# Patient Record
Sex: FEMALE | Race: BLACK OR AFRICAN AMERICAN | Employment: STUDENT | URBAN - METROPOLITAN AREA
[De-identification: names, ages, dates, MRNs, and addresses within clinical notes are randomized per-mention and may not be internally consistent; named-entity substitution may affect disease eponyms.]

---

## 2019-01-08 ENCOUNTER — HOSPITAL ENCOUNTER (INPATIENT)
Age: 24
LOS: 2 days | Discharge: LEFT BEFORE DISCHARGE | DRG: 566 | End: 2019-01-10
Attending: EMERGENCY MEDICINE | Admitting: OBSTETRICS & GYNECOLOGY
Payer: MEDICAID

## 2019-01-08 DIAGNOSIS — Z3A.27 27 WEEKS GESTATION OF PREGNANCY: Primary | ICD-10-CM

## 2019-01-08 DIAGNOSIS — R10.31 ABDOMINAL PAIN, RIGHT LOWER QUADRANT: ICD-10-CM

## 2019-01-08 DIAGNOSIS — R50.81 FEVER IN OTHER DISEASES: ICD-10-CM

## 2019-01-08 PROBLEM — O23.02 PYELONEPHRITIS AFFECTING PREGNANCY IN SECOND TRIMESTER: Status: ACTIVE | Noted: 2019-01-08

## 2019-01-08 LAB
ALBUMIN SERPL-MCNC: 2.9 G/DL (ref 3.4–5)
ALBUMIN/GLOB SERPL: 0.7 {RATIO} (ref 0.8–1.7)
ALP SERPL-CCNC: 114 U/L (ref 45–117)
ALT SERPL-CCNC: 16 U/L (ref 13–56)
ANION GAP SERPL CALC-SCNC: 13 MMOL/L (ref 3–18)
APPEARANCE UR: ABNORMAL
APPEARANCE UR: ABNORMAL
AST SERPL-CCNC: 18 U/L (ref 15–37)
BACTERIA URNS QL MICRO: ABNORMAL /HPF
BASOPHILS # BLD: 0 K/UL (ref 0–0.1)
BASOPHILS NFR BLD: 0 % (ref 0–2)
BILIRUB SERPL-MCNC: 0.4 MG/DL (ref 0.2–1)
BILIRUB UR QL: NEGATIVE
BILIRUB UR QL: NEGATIVE
BUN SERPL-MCNC: 5 MG/DL (ref 7–18)
BUN/CREAT SERPL: 11 (ref 12–20)
CALCIUM SERPL-MCNC: 8.5 MG/DL (ref 8.5–10.1)
CHLORIDE SERPL-SCNC: 101 MMOL/L (ref 100–108)
CO2 SERPL-SCNC: 21 MMOL/L (ref 21–32)
COLOR UR: ABNORMAL
COLOR UR: YELLOW
CREAT SERPL-MCNC: 0.47 MG/DL (ref 0.6–1.3)
DIFFERENTIAL METHOD BLD: ABNORMAL
EOSINOPHIL # BLD: 0 K/UL (ref 0–0.4)
EOSINOPHIL NFR BLD: 0 % (ref 0–5)
EPITH CASTS URNS QL MICRO: NEGATIVE /LPF (ref 0–5)
ERYTHROCYTE [DISTWIDTH] IN BLOOD BY AUTOMATED COUNT: 13.6 % (ref 11.6–14.5)
FLUAV AG NPH QL IA: NEGATIVE
FLUBV AG NOSE QL IA: NEGATIVE
GLOBULIN SER CALC-MCNC: 4.1 G/DL (ref 2–4)
GLUCOSE SERPL-MCNC: 71 MG/DL (ref 74–99)
GLUCOSE UR QL STRIP.AUTO: NEGATIVE MG/DL
GLUCOSE UR STRIP.AUTO-MCNC: NEGATIVE MG/DL
HBSAG, EXTERNAL: NEGATIVE
HCT VFR BLD AUTO: 27.9 % (ref 35–45)
HGB BLD-MCNC: 9.5 G/DL (ref 12–16)
HGB UR QL STRIP: ABNORMAL
HIV, EXTERNAL: NORMAL
KETONES UR QL STRIP.AUTO: 80 MG/DL
KETONES UR-MCNC: 80 MG/DL
LACTATE BLD-SCNC: 0.81 MMOL/L (ref 0.4–2)
LEUKOCYTE ESTERASE UR QL STRIP.AUTO: ABNORMAL
LEUKOCYTE ESTERASE UR QL STRIP: ABNORMAL
LIPASE SERPL-CCNC: 141 U/L (ref 73–393)
LYMPHOCYTES # BLD: 0.8 K/UL (ref 0.9–3.6)
LYMPHOCYTES NFR BLD: 9 % (ref 21–52)
MCH RBC QN AUTO: 27.5 PG (ref 24–34)
MCHC RBC AUTO-ENTMCNC: 34.1 G/DL (ref 31–37)
MCV RBC AUTO: 80.6 FL (ref 74–97)
MONOCYTES # BLD: 0.7 K/UL (ref 0.05–1.2)
MONOCYTES NFR BLD: 8 % (ref 3–10)
NEUTS SEG # BLD: 7.8 K/UL (ref 1.8–8)
NEUTS SEG NFR BLD: 83 % (ref 40–73)
NITRITE UR QL STRIP.AUTO: POSITIVE
NITRITE UR QL: POSITIVE
PH UR STRIP: 5.5 [PH] (ref 5–8)
PH UR: 6 [PH] (ref 5–9)
PLATELET # BLD AUTO: 207 K/UL (ref 135–420)
PMV BLD AUTO: 10.9 FL (ref 9.2–11.8)
POTASSIUM SERPL-SCNC: 3.6 MMOL/L (ref 3.5–5.5)
PROT SERPL-MCNC: 7 G/DL (ref 6.4–8.2)
PROT UR QL: 30 MG/DL
PROT UR STRIP-MCNC: ABNORMAL MG/DL
RBC # BLD AUTO: 3.46 M/UL (ref 4.2–5.3)
RBC # UR STRIP: ABNORMAL /UL
RBC #/AREA URNS HPF: ABNORMAL /HPF (ref 0–5)
RPR, EXTERNAL: NORMAL
RUBELLA, EXTERNAL: NORMAL
SERVICE CMNT-IMP: ABNORMAL
SODIUM SERPL-SCNC: 135 MMOL/L (ref 136–145)
SP GR UR REFRACTOMETRY: 1.01 (ref 1–1.03)
SP GR UR: 1.01 (ref 1–1.02)
TYPE, ABO & RH, EXTERNAL: NORMAL
UROBILINOGEN UR QL STRIP.AUTO: 0.2 EU/DL (ref 0.2–1)
UROBILINOGEN UR QL: 0.2 EU/DL (ref 0.2–1)
WBC # BLD AUTO: 9.4 K/UL (ref 4.6–13.2)
WBC URNS QL MICRO: ABNORMAL /HPF (ref 0–4)

## 2019-01-08 PROCEDURE — 96360 HYDRATION IV INFUSION INIT: CPT

## 2019-01-08 PROCEDURE — 65270000029 HC RM PRIVATE

## 2019-01-08 PROCEDURE — 87077 CULTURE AEROBIC IDENTIFY: CPT

## 2019-01-08 PROCEDURE — 80053 COMPREHEN METABOLIC PANEL: CPT

## 2019-01-08 PROCEDURE — 74011250637 HC RX REV CODE- 250/637: Performed by: PHYSICIAN ASSISTANT

## 2019-01-08 PROCEDURE — 99285 EMERGENCY DEPT VISIT HI MDM: CPT

## 2019-01-08 PROCEDURE — 85025 COMPLETE CBC W/AUTO DIFF WBC: CPT

## 2019-01-08 PROCEDURE — 81001 URINALYSIS AUTO W/SCOPE: CPT

## 2019-01-08 PROCEDURE — 74011250636 HC RX REV CODE- 250/636: Performed by: PHYSICIAN ASSISTANT

## 2019-01-08 PROCEDURE — 83605 ASSAY OF LACTIC ACID: CPT

## 2019-01-08 PROCEDURE — 99218 HC RM OBSERVATION: CPT

## 2019-01-08 PROCEDURE — 87086 URINE CULTURE/COLONY COUNT: CPT

## 2019-01-08 PROCEDURE — 81003 URINALYSIS AUTO W/O SCOPE: CPT

## 2019-01-08 PROCEDURE — 87186 SC STD MICRODIL/AGAR DIL: CPT

## 2019-01-08 PROCEDURE — 87804 INFLUENZA ASSAY W/OPTIC: CPT

## 2019-01-08 PROCEDURE — 74011000250 HC RX REV CODE- 250: Performed by: OBSTETRICS & GYNECOLOGY

## 2019-01-08 PROCEDURE — 74011250636 HC RX REV CODE- 250/636: Performed by: OBSTETRICS & GYNECOLOGY

## 2019-01-08 PROCEDURE — 83690 ASSAY OF LIPASE: CPT

## 2019-01-08 RX ORDER — ACETAMINOPHEN 500 MG
1000 TABLET ORAL
Status: DISCONTINUED | OUTPATIENT
Start: 2019-01-08 | End: 2019-01-11 | Stop reason: HOSPADM

## 2019-01-08 RX ORDER — SODIUM CHLORIDE 9 MG/ML
125 INJECTION, SOLUTION INTRAVENOUS CONTINUOUS
Status: DISCONTINUED | OUTPATIENT
Start: 2019-01-09 | End: 2019-01-11 | Stop reason: HOSPADM

## 2019-01-08 RX ORDER — DIPHENHYDRAMINE HCL 25 MG
25 CAPSULE ORAL
Status: DISCONTINUED | OUTPATIENT
Start: 2019-01-08 | End: 2019-01-11 | Stop reason: HOSPADM

## 2019-01-08 RX ORDER — SODIUM CHLORIDE, SODIUM LACTATE, POTASSIUM CHLORIDE, CALCIUM CHLORIDE 600; 310; 30; 20 MG/100ML; MG/100ML; MG/100ML; MG/100ML
125 INJECTION, SOLUTION INTRAVENOUS CONTINUOUS
Status: DISCONTINUED | OUTPATIENT
Start: 2019-01-09 | End: 2019-01-09

## 2019-01-08 RX ORDER — SODIUM CHLORIDE 0.9 % (FLUSH) 0.9 %
5-40 SYRINGE (ML) INJECTION AS NEEDED
Status: DISCONTINUED | OUTPATIENT
Start: 2019-01-08 | End: 2019-01-11 | Stop reason: HOSPADM

## 2019-01-08 RX ORDER — MAG HYDROX/ALUMINUM HYD/SIMETH 200-200-20
30 SUSPENSION, ORAL (FINAL DOSE FORM) ORAL
Status: DISCONTINUED | OUTPATIENT
Start: 2019-01-08 | End: 2019-01-11 | Stop reason: HOSPADM

## 2019-01-08 RX ORDER — ACETAMINOPHEN 325 MG/1
650 TABLET ORAL
Status: COMPLETED | OUTPATIENT
Start: 2019-01-08 | End: 2019-01-08

## 2019-01-08 RX ORDER — SODIUM CHLORIDE 0.9 % (FLUSH) 0.9 %
5-40 SYRINGE (ML) INJECTION EVERY 8 HOURS
Status: DISCONTINUED | OUTPATIENT
Start: 2019-01-08 | End: 2019-01-09

## 2019-01-08 RX ORDER — SODIUM CHLORIDE 9 MG/ML
150 INJECTION, SOLUTION INTRAVENOUS ONCE
Status: DISPENSED | OUTPATIENT
Start: 2019-01-08 | End: 2019-01-09

## 2019-01-08 RX ORDER — ONDANSETRON 4 MG/1
4 TABLET, ORALLY DISINTEGRATING ORAL
Status: DISCONTINUED | OUTPATIENT
Start: 2019-01-08 | End: 2019-01-11 | Stop reason: HOSPADM

## 2019-01-08 RX ADMIN — SODIUM CHLORIDE 125 ML/HR: 900 INJECTION, SOLUTION INTRAVENOUS at 23:35

## 2019-01-08 RX ADMIN — SODIUM CHLORIDE 1000 ML: 900 INJECTION, SOLUTION INTRAVENOUS at 21:04

## 2019-01-08 RX ADMIN — SODIUM CHLORIDE, SODIUM LACTATE, POTASSIUM CHLORIDE, AND CALCIUM CHLORIDE 125 ML/HR: 600; 310; 30; 20 INJECTION, SOLUTION INTRAVENOUS at 23:49

## 2019-01-08 RX ADMIN — WATER 1 G: 1 INJECTION INTRAMUSCULAR; INTRAVENOUS; SUBCUTANEOUS at 23:35

## 2019-01-08 RX ADMIN — ACETAMINOPHEN 650 MG: 325 TABLET ORAL at 21:03

## 2019-01-09 PROBLEM — Z64.1 GRAND MULTIPARITY: Status: ACTIVE | Noted: 2019-01-09

## 2019-01-09 LAB
ALBUMIN SERPL-MCNC: 2.4 G/DL (ref 3.4–5)
ALBUMIN/GLOB SERPL: 0.6 {RATIO} (ref 0.8–1.7)
ALP SERPL-CCNC: 105 U/L (ref 45–117)
ALT SERPL-CCNC: 12 U/L (ref 13–56)
ANION GAP SERPL CALC-SCNC: 8 MMOL/L (ref 3–18)
AST SERPL-CCNC: 12 U/L (ref 15–37)
BASOPHILS # BLD: 0 K/UL (ref 0–0.1)
BASOPHILS NFR BLD: 0 % (ref 0–2)
BILIRUB SERPL-MCNC: 0.3 MG/DL (ref 0.2–1)
BUN SERPL-MCNC: 6 MG/DL (ref 7–18)
BUN/CREAT SERPL: 14 (ref 12–20)
CALCIUM SERPL-MCNC: 7.3 MG/DL (ref 8.5–10.1)
CHLORIDE SERPL-SCNC: 109 MMOL/L (ref 100–108)
CO2 SERPL-SCNC: 21 MMOL/L (ref 21–32)
CREAT SERPL-MCNC: 0.43 MG/DL (ref 0.6–1.3)
DIFFERENTIAL METHOD BLD: ABNORMAL
EOSINOPHIL # BLD: 0 K/UL (ref 0–0.4)
EOSINOPHIL NFR BLD: 0 % (ref 0–5)
ERYTHROCYTE [DISTWIDTH] IN BLOOD BY AUTOMATED COUNT: 13.9 % (ref 11.6–14.5)
GLOBULIN SER CALC-MCNC: 4.1 G/DL (ref 2–4)
GLUCOSE SERPL-MCNC: 92 MG/DL (ref 74–99)
HCT VFR BLD AUTO: 23.5 % (ref 35–45)
HGB BLD-MCNC: 8 G/DL (ref 12–16)
LYMPHOCYTES # BLD: 1 K/UL (ref 0.9–3.6)
LYMPHOCYTES NFR BLD: 15 % (ref 21–52)
MCH RBC QN AUTO: 26.8 PG (ref 24–34)
MCHC RBC AUTO-ENTMCNC: 34 G/DL (ref 31–37)
MCV RBC AUTO: 78.6 FL (ref 74–97)
MONOCYTES # BLD: 0.5 K/UL (ref 0.05–1.2)
MONOCYTES NFR BLD: 8 % (ref 3–10)
NEUTS SEG # BLD: 5.4 K/UL (ref 1.8–8)
NEUTS SEG NFR BLD: 77 % (ref 40–73)
PLATELET # BLD AUTO: 187 K/UL (ref 135–420)
PMV BLD AUTO: 10.8 FL (ref 9.2–11.8)
POTASSIUM SERPL-SCNC: 3.4 MMOL/L (ref 3.5–5.5)
PROT SERPL-MCNC: 6.5 G/DL (ref 6.4–8.2)
RBC # BLD AUTO: 2.99 M/UL (ref 4.2–5.3)
SODIUM SERPL-SCNC: 138 MMOL/L (ref 136–145)
WBC # BLD AUTO: 7 K/UL (ref 4.6–13.2)

## 2019-01-09 PROCEDURE — 74011250637 HC RX REV CODE- 250/637: Performed by: OBSTETRICS & GYNECOLOGY

## 2019-01-09 PROCEDURE — 74011000250 HC RX REV CODE- 250: Performed by: OBSTETRICS & GYNECOLOGY

## 2019-01-09 PROCEDURE — 74011250636 HC RX REV CODE- 250/636: Performed by: OBSTETRICS & GYNECOLOGY

## 2019-01-09 PROCEDURE — 65270000029 HC RM PRIVATE

## 2019-01-09 PROCEDURE — 80053 COMPREHEN METABOLIC PANEL: CPT

## 2019-01-09 PROCEDURE — 85025 COMPLETE CBC W/AUTO DIFF WBC: CPT

## 2019-01-09 RX ORDER — ZOLPIDEM TARTRATE 5 MG/1
5 TABLET ORAL
Status: DISCONTINUED | OUTPATIENT
Start: 2019-01-09 | End: 2019-01-11 | Stop reason: HOSPADM

## 2019-01-09 RX ORDER — MAG HYDROX/ALUMINUM HYD/SIMETH 200-200-20
30 SUSPENSION, ORAL (FINAL DOSE FORM) ORAL
Status: DISCONTINUED | OUTPATIENT
Start: 2019-01-09 | End: 2019-01-11 | Stop reason: HOSPADM

## 2019-01-09 RX ADMIN — ACETAMINOPHEN 1000 MG: 500 TABLET, FILM COATED ORAL at 20:06

## 2019-01-09 RX ADMIN — SODIUM CHLORIDE 125 ML/HR: 900 INJECTION, SOLUTION INTRAVENOUS at 19:55

## 2019-01-09 RX ADMIN — WATER 1 G: 1 INJECTION INTRAMUSCULAR; INTRAVENOUS; SUBCUTANEOUS at 23:16

## 2019-01-09 RX ADMIN — ACETAMINOPHEN 1000 MG: 500 TABLET, FILM COATED ORAL at 03:50

## 2019-01-09 RX ADMIN — SODIUM CHLORIDE, SODIUM LACTATE, POTASSIUM CHLORIDE, AND CALCIUM CHLORIDE 125 ML/HR: 600; 310; 30; 20 INJECTION, SOLUTION INTRAVENOUS at 03:50

## 2019-01-09 RX ADMIN — ACETAMINOPHEN 1000 MG: 500 TABLET, FILM COATED ORAL at 12:06

## 2019-01-09 RX ADMIN — SODIUM CHLORIDE 125 ML/HR: 900 INJECTION, SOLUTION INTRAVENOUS at 11:10

## 2019-01-09 RX ADMIN — PRENATAL VITAMINS-IRON FUMARATE 27 MG IRON-FOLIC ACID 0.8 MG TABLET 1 TABLET: at 10:00

## 2019-01-09 RX ADMIN — DIPHENHYDRAMINE HYDROCHLORIDE 25 MG: 25 CAPSULE ORAL at 00:35

## 2019-01-09 NOTE — PROGRESS NOTES
2215: Pt arrived via medics accompaniedby self, c/o R sided pain, worse when moving or touching, +CVA tenderness, cramping earlier in the day but was not constant. Denies VB, LOF. 
 
2220: SBAR with MD hilario, Orders received, admit to antepartum for treatment of poss. pylonephritis.

## 2019-01-09 NOTE — ED NOTES
TRANSFER - OUT REPORT: 
 
Verbal report given to Tavon Justin (name) on Luisito Smoke  being transferred to Labor and Delivery(unit) for routine progression of care Report consisted of patients Situation, Background, Assessment and  
Recommendations(SBAR). Information from the following report(s) SBAR was reviewed with the receiving nurse. Lines:  
Peripheral IV 01/08/19 Left Antecubital (Active) Site Assessment Clean, dry, & intact 1/8/2019  8:58 PM  
Phlebitis Assessment 0 1/8/2019  8:58 PM  
Infiltration Assessment 0 1/8/2019  8:58 PM  
Dressing Status Clean, dry, & intact 1/8/2019  8:58 PM  
Hub Color/Line Status Pink 1/8/2019  8:58 PM  
  
 
Opportunity for questions and clarification was provided. Patient transported with: 
 Monitor

## 2019-01-09 NOTE — H&P
History & Physical 
 
Name: Howard Becker MRN: [id number]  SSN: [id number] YOB: 1995  Age: 24 y.o. Sex: female Subjective:  
 
Reason for Admission:  Pregnancy and Pyelonephritis History of Present Illness: Ms. Jeannette Chaudhary is a 24 y.o.  female with an estimated gestational age of 27w3d with Estimated Date of Delivery: 19. Patient complains of mild right sided abdominal pain, fever, moderate headache , moderate nausea/vomiting and severe right sided flank or back pain x for 1 day. Pregnancy has been complicated by recurrent UTI. Patient denies contractions, pelvic pressure, vaginal bleeding  and vaginal leaking of fluid . She reports that she had pyelo with her last pregnancy and tends to get recurrent UTIs during pregnancy. She feels much better since receiving IV Rocephin, IVF, and IV antiemetics. She was almost unable to walk due to the severity of her back pain and was not able to tolerate any food, but pain and N/V are significantly improved. She received prenatal care in [place]. OB History  Para Term  AB Living 7 6 6     5 SAB TAB Ectopic Molar Multiple Live Births 0 6 # Outcome Date GA Lbr El/2nd Weight Sex Delivery Anes PTL Lv  
7 Current 6 Term 2017        EDWIN  
5 Term 16 40w2d 07:48 / 00:03  M VAGINAL DELI EPIDURAL AN N EDWIN  
4 Term 14 40w2d 27:30 / 00:31 7 lb 0.6 oz (3.193 kg) M VAGINAL DELI Local N EDWIN  
3 Term 13    M VAGINAL DELI EPI  DEC Birth Comments: SIDS  
2 Term 12    M VAGINAL DELI EPI  EDWIN  
1 Term 11    M VAGINAL DELI EPI  EDWIN Past Medical History:  
Diagnosis Date  Anemia   
 taking iron supplelments  Anemia NEC  Asthma [address] [place] Pals[place] in last pregnancy  Postpartum depression   
 severe  Psychiatric problem   
 depression  Pyelonephritis affecting pregnancy in second trimester 2019  Sickle-cell disease, unspecified   
 trait History reviewed. No pertinent surgical history. Social History Occupational History  Not on file Tobacco Use  Smoking status: Current Every Day Smoker Packs/day: 0.25  Smokeless tobacco: Never Used Substance and Sexual Activity  Alcohol use: No  
 Drug use: No  
 Sexual activity: Yes  
  Partners: Male Family History Problem Relation Age of Onset  Diabetes Maternal Grandmother  Hypertension Maternal Grandmother  Diabetes Paternal Grandmother  Heart Disease Paternal Grandmother  Hypertension Paternal Grandmother Allergies Allergen Reactions  Banana Itching  Coconut Itching  Iodine Swelling Pt states she has had betadine in the past and can have that  Kiwi Itching  Shellfish Derived Swelling Prior to Admission medications Not on File Review of Systems: A comprehensive review of systems was negative except for that written in the History of Present Illness. Objective:  
 
Vitals:   
Vitals:  
 19 8777 19 0655 19 0700 19 6287 BP:   94/52 Pulse:   86 Resp:      
Temp:      
SpO2: 99% 100% 99% 99% Weight:      
Height:      
  
Temp (24hrs), Av.3 °F (37.4 °C), Min:98.8 °F (37.1 °C), Max:100.1 °F (37.8 °C) BP  Min: 85/50  Max: 101/59 Physical Exam: 
Heart: Regular rate and rhythm Lung: clear to auscultation throughout lung fields Back: costovertebral angle tenderness absent Abdomen: soft, nontender Fundus: soft and non tender Perineum: blood absent, amniotic fluid absent Lower Extremities:  - Edema No    
Membranes:  Intact Uterine Activity:  None Fetal Heart Rate:  Reactive Baseline: 150s per minute Variability: moderate Accelerations: yes Decelerations: none Uterine contractions: none Lab/Data Review: 
Recent Results (from the past 24 hour(s)) POC LACTIC ACID  Collection Time: 19  8:55 PM  
 Result Value Ref Range Lactic Acid (POC) 0.81 0.40 - 2.00 mmol/L  
CBC WITH AUTOMATED DIFF Collection Time: 01/08/19  8:56 PM  
Result Value Ref Range WBC 9.4 4.6 - 13.2 K/uL  
 RBC 3.46 (L) 4.20 - 5.30 M/uL HGB 9.5 (L) 12.0 - 16.0 g/dL HCT 27.9 (L) 35.0 - 45.0 % MCV 80.6 74.0 - 97.0 FL  
 MCH 27.5 24.0 - 34.0 PG  
 MCHC 34.1 31.0 - 37.0 g/dL  
 RDW 13.6 11.6 - 14.5 % PLATELET 907 137 - 037 K/uL MPV 10.9 9.2 - 11.8 FL  
 NEUTROPHILS 83 (H) 40 - 73 % LYMPHOCYTES 9 (L) 21 - 52 % MONOCYTES 8 3 - 10 % EOSINOPHILS 0 0 - 5 % BASOPHILS 0 0 - 2 %  
 ABS. NEUTROPHILS 7.8 1.8 - 8.0 K/UL  
 ABS. LYMPHOCYTES 0.8 (L) 0.9 - 3.6 K/UL  
 ABS. MONOCYTES 0.7 0.05 - 1.2 K/UL  
 ABS. EOSINOPHILS 0.0 0.0 - 0.4 K/UL  
 ABS. BASOPHILS 0.0 0.0 - 0.1 K/UL  
 DF AUTOMATED METABOLIC PANEL, COMPREHENSIVE Collection Time: 01/08/19  8:56 PM  
Result Value Ref Range Sodium 135 (L) 136 - 145 mmol/L Potassium 3.6 3.5 - 5.5 mmol/L Chloride 101 100 - 108 mmol/L  
 CO2 21 21 - 32 mmol/L Anion gap 13 3.0 - 18 mmol/L Glucose 71 (L) 74 - 99 mg/dL BUN 5 (L) 7.0 - 18 MG/DL Creatinine 0.47 (L) 0.6 - 1.3 MG/DL  
 BUN/Creatinine ratio 11 (L) 12 - 20 GFR est AA >60 >60 ml/min/1.73m2 GFR est non-AA >60 >60 ml/min/1.73m2 Calcium 8.5 8.5 - 10.1 MG/DL Bilirubin, total 0.4 0.2 - 1.0 MG/DL  
 ALT (SGPT) 16 13 - 56 U/L  
 AST (SGOT) 18 15 - 37 U/L Alk. phosphatase 114 45 - 117 U/L Protein, total 7.0 6.4 - 8.2 g/dL Albumin 2.9 (L) 3.4 - 5.0 g/dL Globulin 4.1 (H) 2.0 - 4.0 g/dL A-G Ratio 0.7 (L) 0.8 - 1.7 INFLUENZA A & B AG (RAPID TEST) Collection Time: 01/08/19  8:56 PM  
Result Value Ref Range Influenza A Antigen NEGATIVE  NEG Influenza B Antigen NEGATIVE  NEG    
LIPASE Collection Time: 01/08/19  8:56 PM  
Result Value Ref Range Lipase 141 73 - 393 U/L  
POC URINE MACROSCOPIC Collection Time: 01/08/19 10:06 PM  
Result Value Ref Range Color OTHER Appearance SLIGHTLY CLOUDY Spec. gravity (POC) 1.015 1.001 - 1.023    
 pH, urine  (POC) 6.0 5.0 - 9.0 Protein (POC) 30 (A) NEG mg/dL Glucose, urine (POC) NEGATIVE  NEG mg/dL Ketones (POC) 80 (A) NEG mg/dL Bilirubin (POC) NEGATIVE  NEG Blood (POC) SMALL (A) NEG Urobilinogen (POC) 0.2 0.2 - 1.0 EU/dL Nitrite (POC) POSITIVE (A) NEG Leukocyte esterase (POC) LARGE (A) NEG Performed by Alyce Travis URINALYSIS W/MICROSCOPIC Collection Time: 01/08/19 10:20 PM  
Result Value Ref Range Color YELLOW Appearance TURBID Specific gravity 1.011 1.005 - 1.030    
 pH (UA) 5.5 5.0 - 8.0 Protein TRACE (A) NEG mg/dL Glucose NEGATIVE  NEG mg/dL Ketone 80 (A) NEG mg/dL Bilirubin NEGATIVE  NEG Blood SMALL (A) NEG Urobilinogen 0.2 0.2 - 1.0 EU/dL Nitrites POSITIVE (A) NEG Leukocyte Esterase LARGE (A) NEG    
 WBC TOO NUMEROUS TO COUNT 0 - 4 /hpf  
 RBC 1 to 3 0 - 5 /hpf Epithelial cells NEGATIVE  0 - 5 /lpf Bacteria 2+ (A) NEG /hpf METABOLIC PANEL, COMPREHENSIVE Collection Time: 01/09/19  3:50 AM  
Result Value Ref Range Sodium 138 136 - 145 mmol/L Potassium 3.4 (L) 3.5 - 5.5 mmol/L Chloride 109 (H) 100 - 108 mmol/L  
 CO2 21 21 - 32 mmol/L Anion gap 8 3.0 - 18 mmol/L Glucose 92 74 - 99 mg/dL BUN 6 (L) 7.0 - 18 MG/DL Creatinine 0.43 (L) 0.6 - 1.3 MG/DL  
 BUN/Creatinine ratio 14 12 - 20 GFR est AA >60 >60 ml/min/1.73m2 GFR est non-AA >60 >60 ml/min/1.73m2 Calcium 7.3 (L) 8.5 - 10.1 MG/DL Bilirubin, total 0.3 0.2 - 1.0 MG/DL  
 ALT (SGPT) 12 (L) 13 - 56 U/L  
 AST (SGOT) 12 (L) 15 - 37 U/L Alk. phosphatase 105 45 - 117 U/L Protein, total 6.5 6.4 - 8.2 g/dL Albumin 2.4 (L) 3.4 - 5.0 g/dL Globulin 4.1 (H) 2.0 - 4.0 g/dL A-G Ratio 0.6 (L) 0.8 - 1.7    
CBC WITH AUTOMATED DIFF Collection Time: 01/09/19  3:50 AM  
Result Value Ref Range WBC 7.0 4.6 - 13.2 K/uL RBC 2.99 (L) 4.20 - 5.30 M/uL HGB 8.0 (L) 12.0 - 16.0 g/dL HCT 23.5 (L) 35.0 - 45.0 % MCV 78.6 74.0 - 97.0 FL  
 MCH 26.8 24.0 - 34.0 PG  
 MCHC 34.0 31.0 - 37.0 g/dL  
 RDW 13.9 11.6 - 14.5 % PLATELET 123 342 - 013 K/uL MPV 10.8 9.2 - 11.8 FL  
 NEUTROPHILS 77 (H) 40 - 73 % LYMPHOCYTES 15 (L) 21 - 52 % MONOCYTES 8 3 - 10 % EOSINOPHILS 0 0 - 5 % BASOPHILS 0 0 - 2 %  
 ABS. NEUTROPHILS 5.4 1.8 - 8.0 K/UL  
 ABS. LYMPHOCYTES 1.0 0.9 - 3.6 K/UL  
 ABS. MONOCYTES 0.5 0.05 - 1.2 K/UL  
 ABS. EOSINOPHILS 0.0 0.0 - 0.4 K/UL  
 ABS. BASOPHILS 0.0 0.0 - 0.1 K/UL  
 DF AUTOMATED Assessment and Plan: Active Problems: 
  27 weeks gestation of pregnancy (1/8/2019) Pyelonephritis affecting pregnancy in second trimester (1/8/2019) [address] multiparity (1/9/2019) - Pyelonephritis:  Admit to Inpatient Cont IV Rocephin 1 gm IV [id number] until afebrile for 24-48 hours Urine Culture pending Treat nausea and vomiting as indicated IV hydration Antepartum testing-20 minute strip daily Plan to discharge home with rx for treatment and for prophylaxis due to h/o recurrent UTIS. Plan of care discussed with pt who understands. Signed By:  Jacquie Silva MD   
 January 9, 2019

## 2019-01-09 NOTE — ROUTINE PROCESS
1110: Pt transferred from Labor/Delivery to [facility].  
 
1130: Bedside and Verbal shift change report given to ANTHONY Griffin (Postpartum nurse) by VICENTE Easton (Labor/Delivery nurse). Report included the following information SBAR. Pt up in bed on phone. Resting 1715: Pt had episode where she missed being able to make it to the bathroom on time. I cleaned up and reassured pt that it was completed alright. Pt had episode of SOB which was relived by sitting on the bed and resting. Pt now denies SOB, Chest Pain, or Difficulty breathing. 
 
1900: Bedside and Verbal shift change report given to KAYLA Samano (oncoming nurse) by ANTHONY Griffin (offgoing nurse). Report included the following information SBAR.

## 2019-01-09 NOTE — PROGRESS NOTES
Pt seen and examined by Dr. Froylan Gerber at [address] [facility] ED who also ordered labs for patient. See below. Nurse notes R sided abdominal pain with CVA tenderness. Cervix closed. , moderate variability, 10 x 10 accels, no decels 
toco no ctxs Patient Vitals for the past 24 hrs: 
 Temp Pulse Resp BP SpO2  
01/08/19 2157     100 % 01/08/19 2156 98.8 °F (37.1 °C) 92  91/57   
01/08/19 2114  96 20  99 % 01/08/19 2100  97 17 99/60 98 % 01/08/19 2054  (!) 102 19  99 % 01/08/19 2053  (!) 103 15  99 % 01/08/19 2052  (!) 106 16  99 % 01/08/19 2051  (!) 103 19  99 % 01/08/19 2048     100 % 01/08/19 2046    101/59   
01/08/19 2020 100.1 °F (37.8 °C) (!) 109 20 (!) 85/50 99 % Recent Results (from the past 12 hour(s)) POC LACTIC ACID Collection Time: 01/08/19  8:55 PM  
Result Value Ref Range Lactic Acid (POC) 0.81 0.40 - 2.00 mmol/L  
CBC WITH AUTOMATED DIFF Collection Time: 01/08/19  8:56 PM  
Result Value Ref Range WBC 9.4 4.6 - 13.2 K/uL  
 RBC 3.46 (L) 4.20 - 5.30 M/uL HGB 9.5 (L) 12.0 - 16.0 g/dL HCT 27.9 (L) 35.0 - 45.0 % MCV 80.6 74.0 - 97.0 FL  
 MCH 27.5 24.0 - 34.0 PG  
 MCHC 34.1 31.0 - 37.0 g/dL  
 RDW 13.6 11.6 - 14.5 % PLATELET 847 860 - 068 K/uL MPV 10.9 9.2 - 11.8 FL  
 NEUTROPHILS 83 (H) 40 - 73 % LYMPHOCYTES 9 (L) 21 - 52 % MONOCYTES 8 3 - 10 % EOSINOPHILS 0 0 - 5 % BASOPHILS 0 0 - 2 %  
 ABS. NEUTROPHILS 7.8 1.8 - 8.0 K/UL  
 ABS. LYMPHOCYTES 0.8 (L) 0.9 - 3.6 K/UL  
 ABS. MONOCYTES 0.7 0.05 - 1.2 K/UL  
 ABS. EOSINOPHILS 0.0 0.0 - 0.4 K/UL  
 ABS. BASOPHILS 0.0 0.0 - 0.1 K/UL  
 DF AUTOMATED METABOLIC PANEL, COMPREHENSIVE Collection Time: 01/08/19  8:56 PM  
Result Value Ref Range Sodium 135 (L) 136 - 145 mmol/L Potassium 3.6 3.5 - 5.5 mmol/L Chloride 101 100 - 108 mmol/L  
 CO2 21 21 - 32 mmol/L Anion gap 13 3.0 - 18 mmol/L Glucose 71 (L) 74 - 99 mg/dL  BUN 5 (L) 7.0 - 18 MG/DL  
 Creatinine 0.47 (L) 0.6 - 1.3 MG/DL  
 BUN/Creatinine ratio 11 (L) 12 - 20 GFR est AA >60 >60 ml/min/1.73m2 GFR est non-AA >60 >60 ml/min/1.73m2 Calcium 8.5 8.5 - 10.1 MG/DL Bilirubin, total 0.4 0.2 - 1.0 MG/DL  
 ALT (SGPT) 16 13 - 56 U/L  
 AST (SGOT) 18 15 - 37 U/L Alk. phosphatase 114 45 - 117 U/L Protein, total 7.0 6.4 - 8.2 g/dL Albumin 2.9 (L) 3.4 - 5.0 g/dL Globulin 4.1 (H) 2.0 - 4.0 g/dL A-G Ratio 0.7 (L) 0.8 - 1.7 INFLUENZA A & B AG (RAPID TEST) Collection Time: 01/08/19  8:56 PM  
Result Value Ref Range Influenza A Antigen NEGATIVE  NEG Influenza B Antigen NEGATIVE  NEG    
LIPASE Collection Time: 01/08/19  8:56 PM  
Result Value Ref Range Lipase 141 73 - 393 U/L  
POC URINE MACROSCOPIC Collection Time: 01/08/19 10:06 PM  
Result Value Ref Range Color OTHER Appearance SLIGHTLY CLOUDY Spec. gravity (POC) 1.015 1.001 - 1.023    
 pH, urine  (POC) 6.0 5.0 - 9.0 Protein (POC) 30 (A) NEG mg/dL Glucose, urine (POC) NEGATIVE  NEG mg/dL Ketones (POC) 80 (A) NEG mg/dL Bilirubin (POC) NEGATIVE  NEG Blood (POC) SMALL (A) NEG Urobilinogen (POC) 0.2 0.2 - 1.0 EU/dL Nitrite (POC) POSITIVE (A) NEG Leukocyte esterase (POC) LARGE (A) NEG Performed by Alyce Travis URINALYSIS W/MICROSCOPIC Collection Time: 01/08/19 10:20 PM  
Result Value Ref Range Color YELLOW Appearance TURBID Specific gravity 1.011 1.005 - 1.030    
 pH (UA) 5.5 5.0 - 8.0 Protein TRACE (A) NEG mg/dL Glucose NEGATIVE  NEG mg/dL Ketone 80 (A) NEG mg/dL Bilirubin NEGATIVE  NEG Blood SMALL (A) NEG Urobilinogen 0.2 0.2 - 1.0 EU/dL Nitrites POSITIVE (A) NEG Leukocyte Esterase LARGE (A) NEG    
 WBC TOO NUMEROUS TO COUNT 0 - 4 /hpf  
 RBC 1 to 3 0 - 5 /hpf Epithelial cells NEGATIVE  0 - 5 /lpf Bacteria 2+ (A) NEG /hpf A/P:  27 weeks with pyelonephritis, anemia 1. Pyelonephritis--> Will start ceftriaxone, review of records in Care Everywhere from 11/2018 show E. Coli sensitive to ceftriaxone, resistant to gentamicin and bactrim 2. Anemia--> iron and Vit C Full H&P in the am

## 2019-01-09 NOTE — PROGRESS NOTES
Noahlove Delfino Ho 37 care of patient awake and alert in bed. States she is feeling \"much better\" than yesterday. States she couldn't walk yesterday and is walking well to the bathroom this am. Rates pain as a 4 on pain scale. Patient is still experiencing some soreness on right side but states it is better than yesterday. PIV infusing LR at 125cc/hr. No s/s of infiltration noted. Denies any OB complaints. Abdomen palpates soft to exam. Will call Dr. Alex Peters for further orders. 1113 Patient transferred to [facility] per doctor order. Report given to ANTHONY Griffin RN

## 2019-01-09 NOTE — ED PROVIDER NOTES
EMERGENCY DEPARTMENT HISTORY AND PHYSICAL EXAM 
 
8:45 PM 
 
 
Date: 2019 Patient Name: Jose M Lujan History of Presenting Illness Chief Complaint Patient presents with  Abdominal Pain History Provided By: Patient 8:45 PM Jose M Lujan is a 24 y.o. pregnant female with h/o Anemia, Asthma, , 3 high risk pregnancies, sickle-cell disease, postpartum depression, Anemia who presents to ED complaining of acute RLQ/flank pain onset earlier today after waking up. Patient is 6 months pregnant. Tylenol taken before symptoms onset put her to sleep. Last mensaes was 2018. No other concerns or symptoms at this time. PCP: Kajal Rivera MD 
 
 
Current Facility-Administered Medications Medication Dose Route Frequency Provider Last Rate Last Dose  sodium chloride 0.9 % bolus infusion 1,000 mL  1,000 mL IntraVENous ONCE Marielena Ang 1,000 mL/hr at 19 1,000 mL at 19  
 0.9% sodium chloride infusion  150 mL/hr IntraVENous ONCE David Aranda MD      
 
 
Past History Past Medical History: 
Past Medical History:  
Diagnosis Date  Anemia   
 taking iron supplelments  Anemia NEC  Asthma [address] [place] Palsy in last pregnancy  Postpartum depression   
 severe  Psychiatric problem   
 depression  Sickle-cell disease, unspecified   
 trait Past Surgical History: 
History reviewed. No pertinent surgical history. Family History: 
Family History Problem Relation Age of Onset  Diabetes Maternal Grandmother  Hypertension Maternal Grandmother  Diabetes Paternal Grandmother  Heart Disease Paternal Grandmother  Hypertension Paternal Grandmother Social History: 
Social History Tobacco Use  Smoking status: Current Every Day Smoker Packs/day: 0.25 Substance Use Topics  Alcohol use: No  
 Drug use: No  
 
 
Allergies: Allergies Allergen Reactions  Banana Itching  Coconut Itching  Iodine Swelling Pt states she has had betadine in the past and can have that  Kiwi Itching  Shellfish Derived Swelling Review of Systems Review of Systems Constitutional: Negative for activity change and appetite change. HENT: Negative for congestion. Eyes: Negative for visual disturbance. Respiratory: Negative for cough and shortness of breath. Cardiovascular: Negative for chest pain. Gastrointestinal: Positive for abdominal pain. Negative for diarrhea, nausea and vomiting. Genitourinary: Negative for dysuria. Musculoskeletal: Negative for arthralgias and myalgias. Skin: Negative for rash. Neurological: Negative for weakness and numbness. Physical Exam  
 
Visit Vitals BP 99/60 Pulse 97 Temp 100.1 °F (37.8 °C) Resp 17 Ht 5' 2\" (1.575 m) Wt 57.2 kg (126 lb) SpO2 98% Breastfeeding? No  
BMI 23.05 kg/m² Physical Exam  
Constitutional: She is oriented to person, place, and time. She appears well-developed and well-nourished. No distress. HENT:  
Head: Normocephalic. Right Ear: External ear normal.  
Left Ear: External ear normal.  
Mouth/Throat: No oropharyngeal exudate. Eyes: Conjunctivae and EOM are normal. Pupils are equal, round, and reactive to light. Right eye exhibits no discharge. Left eye exhibits no discharge. No scleral icterus. Neck: Normal range of motion. Neck supple. No JVD present. No tracheal deviation present. No thyromegaly present. Cardiovascular: Normal rate, regular rhythm, normal heart sounds and intact distal pulses. Exam reveals no gallop and no friction rub. No murmur heard. Pulmonary/Chest: Effort normal and breath sounds normal. No stridor. No respiratory distress. She has no wheezes. She has no rales. She exhibits no tenderness. Abdominal: Soft. Bowel sounds are normal. She exhibits no distension and no mass. There is no tenderness. There is no rebound and no guarding. Musculoskeletal: Normal range of motion. She exhibits no edema or tenderness. Lymphadenopathy:  
  She has no cervical adenopathy. Neurological: She is alert and oriented to person, place, and time. She displays normal reflexes. No cranial nerve deficit. She exhibits normal muscle tone. Coordination normal.  
Skin: Skin is warm and dry. No rash noted. She is not diaphoretic. No erythema. No pallor. Nursing note and vitals reviewed. Diagnostic Study Results Vitals: 
Patient Vitals for the past 12 hrs: 
 Temp Pulse Resp BP SpO2  
01/08/19 2100  97 17 99/60 98 % 01/08/19 2054  (!) 102 19  99 % 01/08/19 2053  (!) 103 15  99 % 01/08/19 2052  (!) 106 16  99 % 01/08/19 2051  (!) 103 19  99 % 01/08/19 2048     100 % 01/08/19 2046    101/59   
01/08/19 2020 100.1 °F (37.8 °C) (!) 109 20 (!) 85/50 99 % Medications ordered:  
Medications  
sodium chloride 0.9 % bolus infusion 1,000 mL (1,000 mL IntraVENous New Bag 1/8/19 2104)  
0.9% sodium chloride infusion (not administered)  
acetaminophen (TYLENOL) tablet 650 mg (650 mg Oral Given 1/8/19 2103) Lab findings: 
Recent Results (from the past 12 hour(s)) POC LACTIC ACID Collection Time: 01/08/19  8:55 PM  
Result Value Ref Range Lactic Acid (POC) 0.81 0.40 - 2.00 mmol/L  
CBC WITH AUTOMATED DIFF Collection Time: 01/08/19  8:56 PM  
Result Value Ref Range WBC 9.4 4.6 - 13.2 K/uL  
 RBC 3.46 (L) 4.20 - 5.30 M/uL HGB 9.5 (L) 12.0 - 16.0 g/dL HCT 27.9 (L) 35.0 - 45.0 % MCV 80.6 74.0 - 97.0 FL  
 MCH 27.5 24.0 - 34.0 PG  
 MCHC 34.1 31.0 - 37.0 g/dL  
 RDW 13.6 11.6 - 14.5 % PLATELET 349 896 - 050 K/uL MPV 10.9 9.2 - 11.8 FL  
 NEUTROPHILS 83 (H) 40 - 73 % LYMPHOCYTES 9 (L) 21 - 52 % MONOCYTES 8 3 - 10 % EOSINOPHILS 0 0 - 5 % BASOPHILS 0 0 - 2 %  
 ABS. NEUTROPHILS 7.8 1.8 - 8.0 K/UL  
 ABS. LYMPHOCYTES 0.8 (L) 0.9 - 3.6 K/UL  
 ABS. MONOCYTES 0.7 0.05 - 1.2 K/UL ABS. EOSINOPHILS 0.0 0.0 - 0.4 K/UL  
 ABS. BASOPHILS 0.0 0.0 - 0.1 K/UL  
 DF AUTOMATED X-Ray, CT or other radiology findings or impressions: No orders to display Progress notes, Consult notes or additional Procedure notes:  
8:49 PM Patient to be transferred to [facility] due to pregnancy, L&D. Consult:  Discussed care with GILLIAN Wilkinson Standard discussion; including history of patients chief complaint, available diagnostic results, and treatment course. Patient transfer to [facility] for L&D. 
8:29 PM, 1/8/2019 Consult:  Discussed care with GILLIAN Wilkinson Standard discussion; including history of patients chief complaint, available diagnostic results, and treatment course. Accepts Patient for transfer to [facility] for L&D 
9:00 PM, 1/8/2019 Disposition: 
Diagnosis:  
1. 27 weeks gestation of pregnancy 2. Fever in other diseases 3. Abdominal pain, right lower quadrant Disposition: Transfer Follow-up Information None Medication List  
  
You have not been prescribed any medications. Scribe Attestation Madhuri Rosario acting as a scribe for and in the presence of Belén Duque MD     
January 08, 2019 at 8:45 PM 
    
Provider Attestation:     
I personally performed the services described in the documentation, reviewed the documentation, as recorded by the scribe in my presence, and it accurately and completely records my words and actions.  January 08, 2019 at 8:45 PM - Belén Duque MD

## 2019-01-09 NOTE — ED TRIAGE NOTES
Patient states onset of right lower quadrant abdominal pain earlier today. She states that she is approximately 6 months pregnant. Advises that her ОЛЬГА is 4/9/19. C/o vomiting.

## 2019-01-09 NOTE — ED NOTES
I performed a brief evaluation, including history and physical, of the patient here in triage and I have determined that pt will need further treatment and evaluation from the main side ER physician. I have placed initial orders to help in expediting patients care. January 08, 2019 at 8:20 PM - Galileo Ang There were no vitals taken for this visit.

## 2019-01-10 ENCOUNTER — APPOINTMENT (OUTPATIENT)
Dept: GENERAL RADIOLOGY | Age: 24
DRG: 566 | End: 2019-01-10
Attending: STUDENT IN AN ORGANIZED HEALTH CARE EDUCATION/TRAINING PROGRAM
Payer: MEDICAID

## 2019-01-10 VITALS
WEIGHT: 126 LBS | SYSTOLIC BLOOD PRESSURE: 99 MMHG | RESPIRATION RATE: 18 BRPM | TEMPERATURE: 98.2 F | DIASTOLIC BLOOD PRESSURE: 60 MMHG | OXYGEN SATURATION: 100 % | HEIGHT: 62 IN | HEART RATE: 84 BPM | BODY MASS INDEX: 23.19 KG/M2

## 2019-01-10 LAB
ANION GAP SERPL CALC-SCNC: 8 MMOL/L (ref 3–18)
ATRIAL RATE: 81 BPM
BASOPHILS # BLD: 0 K/UL (ref 0–0.1)
BASOPHILS NFR BLD: 0 % (ref 0–2)
BUN SERPL-MCNC: 4 MG/DL (ref 7–18)
BUN/CREAT SERPL: 11 (ref 12–20)
CALCIUM SERPL-MCNC: 7.4 MG/DL (ref 8.5–10.1)
CALCULATED P AXIS, ECG09: 31 DEGREES
CALCULATED R AXIS, ECG10: 43 DEGREES
CALCULATED T AXIS, ECG11: -16 DEGREES
CHLORIDE SERPL-SCNC: 112 MMOL/L (ref 100–108)
CK MB CFR SERPL CALC: NORMAL % (ref 0–4)
CK MB SERPL-MCNC: <1 NG/ML (ref 5–25)
CK SERPL-CCNC: 44 U/L (ref 26–192)
CO2 SERPL-SCNC: 22 MMOL/L (ref 21–32)
CREAT SERPL-MCNC: 0.38 MG/DL (ref 0.6–1.3)
DIAGNOSIS, 93000: NORMAL
DIFFERENTIAL METHOD BLD: ABNORMAL
EOSINOPHIL # BLD: 0.1 K/UL (ref 0–0.4)
EOSINOPHIL NFR BLD: 1 % (ref 0–5)
ERYTHROCYTE [DISTWIDTH] IN BLOOD BY AUTOMATED COUNT: 14 % (ref 11.6–14.5)
GLUCOSE SERPL-MCNC: 69 MG/DL (ref 74–99)
HCT VFR BLD AUTO: 24.5 % (ref 35–45)
HGB BLD-MCNC: 8.3 G/DL (ref 12–16)
LYMPHOCYTES # BLD: 1.8 K/UL (ref 0.9–3.6)
LYMPHOCYTES NFR BLD: 26 % (ref 21–52)
MCH RBC QN AUTO: 26.7 PG (ref 24–34)
MCHC RBC AUTO-ENTMCNC: 33.9 G/DL (ref 31–37)
MCV RBC AUTO: 78.8 FL (ref 74–97)
MONOCYTES # BLD: 0.8 K/UL (ref 0.05–1.2)
MONOCYTES NFR BLD: 11 % (ref 3–10)
NEUTS SEG # BLD: 4.4 K/UL (ref 1.8–8)
NEUTS SEG NFR BLD: 62 % (ref 40–73)
P-R INTERVAL, ECG05: 146 MS
PLATELET # BLD AUTO: 183 K/UL (ref 135–420)
PMV BLD AUTO: 10.3 FL (ref 9.2–11.8)
POTASSIUM SERPL-SCNC: 3.2 MMOL/L (ref 3.5–5.5)
Q-T INTERVAL, ECG07: 370 MS
QRS DURATION, ECG06: 76 MS
QTC CALCULATION (BEZET), ECG08: 429 MS
RBC # BLD AUTO: 3.11 M/UL (ref 4.2–5.3)
SODIUM SERPL-SCNC: 142 MMOL/L (ref 136–145)
TROPONIN I SERPL-MCNC: <0.02 NG/ML (ref 0–0.04)
VENTRICULAR RATE, ECG03: 81 BPM
WBC # BLD AUTO: 7.2 K/UL (ref 4.6–13.2)

## 2019-01-10 PROCEDURE — 82550 ASSAY OF CK (CPK): CPT

## 2019-01-10 PROCEDURE — 74011250637 HC RX REV CODE- 250/637: Performed by: OBSTETRICS & GYNECOLOGY

## 2019-01-10 PROCEDURE — 74011250636 HC RX REV CODE- 250/636: Performed by: OBSTETRICS & GYNECOLOGY

## 2019-01-10 PROCEDURE — 71045 X-RAY EXAM CHEST 1 VIEW: CPT

## 2019-01-10 PROCEDURE — 80048 BASIC METABOLIC PNL TOTAL CA: CPT

## 2019-01-10 PROCEDURE — 74011000250 HC RX REV CODE- 250: Performed by: OBSTETRICS & GYNECOLOGY

## 2019-01-10 PROCEDURE — 93005 ELECTROCARDIOGRAM TRACING: CPT

## 2019-01-10 PROCEDURE — 36415 COLL VENOUS BLD VENIPUNCTURE: CPT

## 2019-01-10 PROCEDURE — 85025 COMPLETE CBC W/AUTO DIFF WBC: CPT

## 2019-01-10 RX ORDER — CEPHALEXIN 500 MG/1
500 CAPSULE ORAL 4 TIMES DAILY
Qty: 60 CAP | Refills: 3 | Status: SHIPPED | OUTPATIENT
Start: 2019-01-10 | End: 2019-01-20

## 2019-01-10 RX ADMIN — PRENATAL VITAMINS-IRON FUMARATE 27 MG IRON-FOLIC ACID 0.8 MG TABLET 1 TABLET: at 08:29

## 2019-01-10 RX ADMIN — SODIUM CHLORIDE 125 ML/HR: 900 INJECTION, SOLUTION INTRAVENOUS at 03:46

## 2019-01-10 RX ADMIN — WATER 1 G: 1 INJECTION INTRAMUSCULAR; INTRAVENOUS; SUBCUTANEOUS at 20:00

## 2019-01-10 RX ADMIN — ACETAMINOPHEN 1000 MG: 500 TABLET, FILM COATED ORAL at 18:28

## 2019-01-10 RX ADMIN — ACETAMINOPHEN 1000 MG: 500 TABLET, FILM COATED ORAL at 04:16

## 2019-01-10 NOTE — PROGRESS NOTES
Ante Partum Progress Note [facility] 
[id number] Patient admitted for pyelonephritis states she does have right pain on her mid right back, but states it has improved since admission. she denies f/c, abdominal cramps, lof, vaginal bleeding, +FM. Rapid response called early this morning because patient c/o chest pain. W/u negative. She was diagnosed with anxiety and GERD. Pt denies any this symptoms currently LOS: 3 Vitals: Temp (24hrs), Av.4 °F (36.9 °C), Min:98 °F (36.7 °C), Max:99.3 °F (37.4 °C) Patient Vitals for the past 24 hrs: 
 BP  
01/10/19 0733 93/58  
01/10/19 0402 97/65  
01/10/19 0352 95/60  
01/10/19 0137 94/59  
19 1955 102/64 Gen: 
 
 
 
Non stress test:  Not done yet today. Additional Exam: Patient without distress, Abdomen soft, non-tender, Fundus soft and non tender and Lower extremities edema No 
+R CVA tenderness Labs:  
 
Lab Results Component Value Date/Time WBC 7.2 01/10/2019 04:03 AM  
 WBC 7.0 2019 03:50 AM  
 WBC 9.4 2019 08:56 PM  
 HGB 8.3 (L) 01/10/2019 04:03 AM  
 HGB 8.0 (L) 2019 03:50 AM  
 HGB 9.5 (L) 2019 08:56 PM  
 HCT 24.5 (L) 01/10/2019 04:03 AM  
 HCT 23.5 (L) 2019 03:50 AM  
 HCT 27.9 (L) 2019 08:56 PM  
 PLATELET 905  04:03 AM  
 PLATELET 407 15/45/6385 03:50 AM  
 PLATELET 790  08:56 PM  
 
 
Recent Results (from the past 24 hour(s)) EKG, 12 LEAD, INITIAL Collection Time: 01/10/19  3:58 AM  
Result Value Ref Range Ventricular Rate 81 BPM  
 Atrial Rate 81 BPM  
 P-R Interval 146 ms  
 QRS Duration 76 ms  
 Q-T Interval 370 ms QTC Calculation (Bezet) 429 ms Calculated P Axis 31 degrees Calculated R Axis 43 degrees Calculated T Axis -16 degrees Diagnosis Normal sinus rhythm Nonspecific T wave abnormality Abnormal ECG No previous ECGs available CBC WITH AUTOMATED DIFF  Collection Time: 01/10/19  4:03 AM  
 Result Value Ref Range WBC 7.2 4.6 - 13.2 K/uL  
 RBC 3.11 (L) 4.20 - 5.30 M/uL HGB 8.3 (L) 12.0 - 16.0 g/dL HCT 24.5 (L) 35.0 - 45.0 % MCV 78.8 74.0 - 97.0 FL  
 MCH 26.7 24.0 - 34.0 PG  
 MCHC 33.9 31.0 - 37.0 g/dL  
 RDW 14.0 11.6 - 14.5 % PLATELET 836 022 - 524 K/uL MPV 10.3 9.2 - 11.8 FL  
 NEUTROPHILS 62 40 - 73 % LYMPHOCYTES 26 21 - 52 % MONOCYTES 11 (H) 3 - 10 % EOSINOPHILS 1 0 - 5 % BASOPHILS 0 0 - 2 %  
 ABS. NEUTROPHILS 4.4 1.8 - 8.0 K/UL  
 ABS. LYMPHOCYTES 1.8 0.9 - 3.6 K/UL  
 ABS. MONOCYTES 0.8 0.05 - 1.2 K/UL  
 ABS. EOSINOPHILS 0.1 0.0 - 0.4 K/UL  
 ABS. BASOPHILS 0.0 0.0 - 0.1 K/UL  
 DF AUTOMATED METABOLIC PANEL, BASIC Collection Time: 01/10/19  4:03 AM  
Result Value Ref Range Sodium 142 136 - 145 mmol/L Potassium 3.2 (L) 3.5 - 5.5 mmol/L Chloride 112 (H) 100 - 108 mmol/L  
 CO2 22 21 - 32 mmol/L Anion gap 8 3.0 - 18 mmol/L Glucose 69 (L) 74 - 99 mg/dL BUN 4 (L) 7.0 - 18 MG/DL Creatinine 0.38 (L) 0.6 - 1.3 MG/DL  
 BUN/Creatinine ratio 11 (L) 12 - 20 GFR est AA >60 >60 ml/min/1.73m2 GFR est non-AA >60 >60 ml/min/1.73m2 Calcium 7.4 (L) 8.5 - 10.1 MG/DL  
CARDIAC PANEL,(CK, CKMB & TROPONIN) Collection Time: 01/10/19  4:03 AM  
Result Value Ref Range CK 44 26 - 192 U/L  
 CK - MB <1.0 <3.6 ng/ml CK-MB Index  0.0 - 4.0 % CALCULATION NOT PERFORMED WHEN RESULT IS BELOW LINEAR LIMIT Troponin-I, QT <0.02 0.0 - 0.045 NG/ML Assessment: 27w2d Pyelonephritis-->improving on IV Rocephin, afebrile [id number] Plan:   
Urine cx with +gram negative rods. Awaiting final cultures and sensitivities. Per micro lab will not be in until tomorrow. D/c home tomorrow if patient continues to improve clinically and remains afebrile. Will d/c home on po abx pending culture sensitivities.    
 
 
Addendum: NST performed: reactive, baseline 155bpm, moderate variability, +acels, no decels, no contractions on toco 
 Raghu Bowden MD 
0/79/390912:95 AM

## 2019-01-10 NOTE — PROGRESS NOTES
Rapid Response Note [address] [facility] Patient: Fely Armas 24 y.o. female [id number] 1995 Admit Date: 1/8/2019 Admission Diagnosis: 27 weeks gestation of pregnancy [Z3A.27] Pyelonephritis affecting pregnancy in second trimester [O23.02] RAPID RESPONSE Rapid response called for chest pain. Pt was awoken with sharp sternal pain. She has had a similar episode in the past which was much worse, dx with anxiety. She also has hx of GERD. No hx of blood clots. She denies SOB, Dizziness, MSK Pain, Abdominal Pain,  
Pain resolved within minutes of evaluation. Medications Reviewed OBJECTIVE Visit Vitals BP 94/59 Pulse 73 Temp 98 °F (36.7 °C) Resp 16 Ht 5' 2\" (1.575 m) Wt 57.2 kg (126 lb) SpO2 100% Breastfeeding? No  
BMI 23.05 kg/m² Physical Exam:  
General: healthy, alert, well developed and cooperative Cardiovascular: RRR w/o MRGs Respiratory: CTAB w/o rales, rhonchi, wheezes, no increased work of breathing Abdomen: gravid Extremities: no edema in lower extremities bilaterally, 2+ radial pulses intact bilaterally, no LE tenderness Neuro: Cranial nerves grossly intact, grossly moving upper and lower extremities Skin: Negative for lesions, ulcers, rashes ASSESSMENT, PLAN & DISPOSITION Fely Armas is a 24y.o. year old female admitted for 27 weeks gestation of pregnancy [Z3A.27] Pyelonephritis affecting pregnancy in second trimester [O23.02]. Rapid response called for chest pain. Patient condition currently: stable. EKG: NRS, non-specific T wave changes, no STEMI, no R heart strain, HR 80s Labs: CBC, BMP, Cardiac Panel CXR No indication of PE, cardiac, or pulmonary etiology as evidenced by above. Disposition: room Attending Dr. Marylen Noun notified of rapid response. In agreement with plan. Primary team resuming care.   
 
 
Shiloh Martin DO, PGY I 
[facility] 
01/10/19 
4:07 AM

## 2019-01-10 NOTE — PROGRESS NOTES
1915: Bedside and Verbal shift change report given to KAYLA Rojas RN (oncoming nurse) by ANTHONY Griffin RN (offgoing nurse). Report included the following information SBAR, Kardex, Intake/Output, MAR and Recent Results. 1955: pt assessed, VS taken. 0350: Pt complaining of chest pain and SOB. Pt is alert and oriented x 4, answering questions. Rapid response called. Dr. Анна Mcguire arrived on the floor with rapid response team. Evaluated patient while VS is being taken by staff. Ordered EKG, labs (CK, CKMB & Troponin), and XR Chest port. Dr. Yolette Helm reviewed results and informed patient. Diagnosed pt with anxiety and GERD. Pt verbalized understanding. Throughout the course of the RRT, Pt stated that she feels much better. (Please see Rapid response on flowsheet) 6158: RRT ended, pt requested tylenol for her headache. 
 
0715: Bedside and Verbal shift change report given to ANTHONY Griffin RN (oncoming nurse) by Alexander Rojas RN (offgoing nurse). Report included the following information SBAR, Kardex, Intake/Output, MAR and Recent Results.

## 2019-01-10 NOTE — DISCHARGE SUMMARY
Antepartum  Discharge Summary     Patient ID:  Estela Carrasco  [id number]  46 y.o.  1995    Admit date: 1/8/2019    Discharge date and time: 1/10/2019    Admission Diagnoses:    Patient Active Problem List   Diagnosis Code    IUP (intrauterine pregnancy), incidental Z34.90    Normal labor O80, Z37.9    Postpartum examination following vaginal delivery Z39.2    27 weeks gestation of pregnancy Z3A.27    Pyelonephritis affecting pregnancy in second trimester O23.02    [address] multiparity Z64.1       Discharge Diagnoses: There are no discharge diagnoses documented for the most recent discharge. Problem List as of 1/10/2019 Date Reviewed: 1/11/2016          Codes Class Noted - Resolved    Grand multiparity ICD-10-CM: Z64.1  ICD-9-CM: V61.5  1/9/2019 - Present        27 weeks gestation of pregnancy ICD-10-CM: Z3A.27  ICD-9-CM: V22.2  1/8/2019 - Present        Pyelonephritis affecting pregnancy in second trimester ICD-10-CM: O23.02  ICD-9-CM: 646.63, 590.80  1/8/2019 - Present        Normal labor ICD-10-CM: Nurys Tenzin, Z37.9  ICD-9-CM: 413  1/11/2016 - Present        Postpartum examination following vaginal delivery ICD-10-CM: Z39.2  ICD-9-CM: V24.2  1/11/2016 - Present        IUP (intrauterine pregnancy), incidental ICD-10-CM: Z34.90  ICD-9-CM: V22.2  7/3/2014 - Present        RESOLVED: Labor and delivery indication for care or intervention ICD-10-CM: O75.9  ICD-9-CM: 659.90  7/2/2014 - 7/3/2014        RESOLVED: SGA (small for gestational age) ICD-10-CM: P0.11  ICD-9-CM: 764.00  7/2/2014 - 7/3/2014        RESOLVED: [facility] ICD-10-CM: D57.3  ICD-9-CM: 282.5  7/2/2014 - 7/3/2014              Hospital Course: Pt admitted for pyelonephritis and was treated with IV abx x 24hours. She remained afebrile during hospital admission and showed some clinical improved. Her urine cx grew +gram rods. On HD2 a rapid response was called because pt c/o chest pain.  Work up was negative for any cardiopulmonary causes and she was diagnosed with anxiety and GERD. She was to stay until final urine culture and sensitivities returned but patient signed out AMA after R/B of staying vs. Leaving discussed. She made an appointment to see her primary OV on 1/11 at 145p. Disposition: Pt left AMA    Discharged Condition: stable            Patient Instructions:   Current Discharge Medication List      START taking these medications    Details   cephALEXin (KEFLEX) 500 mg capsule Take 1 Cap by mouth four (4) times daily for 10 days. Then take 1 cap daily until you give birth  Qty: 61 Cap, Refills: 3           Activity: Activity as tolerated and Activity as tolerated and no driving for today  Diet: Regular Diet    Follow-up with No orders of the defined types were placed in this encounter.        Signed:  Elio Conde MD  1/10/95376:19 PM

## 2019-01-10 NOTE — PROGRESS NOTES
Called by RN that patient would like to leave AMA. She has made a follow up appointment with her OB/GYN in [address] [place] [address] tomorrow at 145p. The patient has h/o of recurrent Laqueta Show since 10/2018 and late prenatal. Her first OB appointment was in November 2018. Discussed with patient the benefit of staying one more day would be to await final urine culture and sensitivities to ensure that she is placed on the right antibiotics to prevent recurrence of the infection. Aslo discussed the risks of leaving including worsening infection, renal failure, respiratory failure and maternal and fetal death. Pt expressed understanding. She would still like to leave AMA. Will send rx for Keflex x 14 days and then daily for prophylaxis.

## 2019-01-10 NOTE — ROUTINE PROCESS
[id number]: Shift Summary Report 
 
0700: Bedside and Verbal shift change report given to ANTHONY Griffin (oncoming nurse) by Buster Mclaughlin. Sandra Shin (offgoing nurse). Report included the following information SBAR.  
 
0715: Introduction to Pt, Discussed care plan, Pt verbalizes understanding of care plan. Safety issues check. Pt denies needs or assistance at this time. Pt up in bed resting 
 
0733: Pt Assessment completed. Pt denies chest pain,difficulty breathing or shortness of breath. \"Stated feeling the baby move around\" 0945: Pt request to talk to care management 1018: Talked with pt about her stable low BP and possible nicotine patch 
 
1038: Dr. Francisco J Nolan called regarding pt request for nicotine patch 
 
[id number]: Dr. Francisco J Nolan chat about pts request for nicotine patch. Dr. Francisco J Nolan denies patch for pt, stated that she can have family bring a patch in or she can go out and smoke (pt have IV in) 
 
1233: IV infiltrated, Dr. Francisco J Nolan came back in the room to talk with patient, stated that the patient will need a new IV for antibiotic purpose and that she will not be DC until tomorrow morning. Pt stated that she wanted to go home. Pt will take with her sister to find follow up care and decide if she will be DC today. 1235: IV will be replaced once patient decide on what she is doing regarding follow up care. 1314: Pt up in bed. Dr. Francisco J Nolan stated that pt can leave AMA and find pharmacy for antibiotics to go to 
 
[address]: Pt has schedule apt with [facility] at [phone] on 01/11/2019 in [facility]. Pharmacy is [facility] [address] in [address] [facility]. Pt wishes to leave AMA 
 
1335: Dr. Francisco J Nolan notified that the pt wishes to leave AMA and has follow up apt tomorrow with ZURI. Dr. Jack Amos to put on AMA sheet that the pt was at increase risk for worse infection, sepsis, renal failure, respiratory depress, maternal and infant death. 1400: Tania in room with patient 1415: Pt up in bed talking with Madiha. Pt agreed to put AMA on hold until she talks to her insurance. And wanted to see another Dr. Cleary will follow up and notify me. 1416: Pt on phone with insurance 1444: Pt stated that insurance would cover her is she left AMA. 1508: Pt stated that she will talk with family before she makes a decision. 1545: Pt off the unit to smoke. Discussion with patient. Pt stated that she now will stay until after her midnight dose of antibiotics. AMA form still in pts chart. Pt denies further assistance needed at this time 1602: Pt back on unit with visitor. Up in bed 
 
1620: Discussed with pt, that Dr. Kenna Benjamin will be in tomorrow to talk with the patient regarding her car if she wants to stay until tomorrow. Pt wishes to leave AMA after receiving 0000 antibiotics 1643: In room with patient discussing options of discharge with patient. Pt is aware that antibiotics are sent to pharmacy. Pt stated that she wishes to stay until 0000 after she receives antibiotics and then will sign AMA 
 
1655: Dr. Warren Gentile called regarding the patient wanting tums for antacids. Dr said she would right an order 
 
1700: Pt up in bed eating dinner. Notified that Dr. Warren Gentile will write an order for antacids 1822: Pt denies indigestion 1900: Bedside and Verbal shift change report given to KAYLA Scales (oncoming nurse) by ANTHONY Griffin (offgoing nurse). Report included the following information SBAR.

## 2019-01-11 LAB
BACTERIA SPEC CULT: ABNORMAL
SERVICE CMNT-IMP: ABNORMAL

## 2019-01-11 NOTE — PROGRESS NOTES
1915: Bedside and Verbal shift change report given to KAYLA Rojas, RN (oncoming nurse) by ANTHONY Griffin RN (offgoing nurse). Report included the following information SBAR, Kardex, Intake/Output, MAR and Recent Results. -pt trying to leave AMA. Explained risk to pt. Pt still wishes to leave AMA after antibiotic at 0000. 
 
1945: Dr. Erick Perez states pt can get her rocephin @ 8pm and leave after 1955: 22g on right hand inserted for rocephin administration. 2000: Rocephin administered. 2005: 22G on right hand DC'd 
 
2035: Pt signed AMA paper, this RN witnessed 2040: Pt off the unit

## 2019-03-26 LAB — GRBS, EXTERNAL: NEGATIVE

## 2019-04-03 ENCOUNTER — ANESTHESIA EVENT (OUTPATIENT)
Dept: LABOR AND DELIVERY | Age: 24
DRG: 560 | End: 2019-04-03
Payer: MEDICAID

## 2019-04-03 ENCOUNTER — HOSPITAL ENCOUNTER (INPATIENT)
Age: 24
LOS: 2 days | Discharge: HOME | DRG: 560 | End: 2019-04-05
Attending: OBSTETRICS & GYNECOLOGY | Admitting: OBSTETRICS & GYNECOLOGY
Payer: MEDICAID

## 2019-04-03 ENCOUNTER — ANESTHESIA (OUTPATIENT)
Dept: LABOR AND DELIVERY | Age: 24
DRG: 560 | End: 2019-04-03
Payer: MEDICAID

## 2019-04-03 PROBLEM — O09.90 AT HIGH RISK FOR COMPLICATIONS OF INTRAUTERINE PREGNANCY (IUP): Status: ACTIVE | Noted: 2019-04-03

## 2019-04-03 LAB
ABO + RH BLD: NORMAL
AMPHET UR QL SCN: NEGATIVE
APPEARANCE UR: ABNORMAL
BARBITURATES UR QL SCN: NEGATIVE
BASOPHILS # BLD: 0 K/UL (ref 0–0.1)
BASOPHILS NFR BLD: 0 % (ref 0–2)
BENZODIAZ UR QL: NEGATIVE
BILIRUB UR QL: NEGATIVE
BLOOD GROUP ANTIBODIES SERPL: NORMAL
CANNABINOIDS UR QL SCN: NEGATIVE
COCAINE UR QL SCN: NEGATIVE
COLOR UR: ABNORMAL
DIFFERENTIAL METHOD BLD: ABNORMAL
EOSINOPHIL # BLD: 0 K/UL (ref 0–0.4)
EOSINOPHIL NFR BLD: 0 % (ref 0–5)
ERYTHROCYTE [DISTWIDTH] IN BLOOD BY AUTOMATED COUNT: 16.6 % (ref 11.6–14.5)
FLUAV AG NPH QL IA: NEGATIVE
FLUBV AG NOSE QL IA: NEGATIVE
GLUCOSE UR QL STRIP.AUTO: NEGATIVE MG/DL
HCT VFR BLD AUTO: 24.6 % (ref 35–45)
HDSCOM,HDSCOM: NORMAL
HGB BLD-MCNC: 7.9 G/DL (ref 12–16)
KETONES UR-MCNC: 15 MG/DL
LEUKOCYTE ESTERASE UR QL STRIP: ABNORMAL
LYMPHOCYTES # BLD: 1.3 K/UL (ref 0.9–3.6)
LYMPHOCYTES NFR BLD: 10 % (ref 21–52)
MCH RBC QN AUTO: 22.6 PG (ref 24–34)
MCHC RBC AUTO-ENTMCNC: 32.1 G/DL (ref 31–37)
MCV RBC AUTO: 70.3 FL (ref 74–97)
METHADONE UR QL: NEGATIVE
MONOCYTES # BLD: 1.5 K/UL (ref 0.05–1.2)
MONOCYTES NFR BLD: 11 % (ref 3–10)
NEUTS SEG # BLD: 10.6 K/UL (ref 1.8–8)
NEUTS SEG NFR BLD: 79 % (ref 40–73)
NITRITE UR QL: NEGATIVE
OPIATES UR QL: NEGATIVE
PCP UR QL: NEGATIVE
PH UR: 7 [PH] (ref 5–9)
PLATELET # BLD AUTO: 249 K/UL (ref 135–420)
PMV BLD AUTO: 10.6 FL (ref 9.2–11.8)
PROT UR QL: ABNORMAL MG/DL
RBC # BLD AUTO: 3.5 M/UL (ref 4.2–5.3)
RBC # UR STRIP: ABNORMAL /UL
SERVICE CMNT-IMP: ABNORMAL
SP GR UR: 1.01 (ref 1–1.02)
SPECIMEN EXP DATE BLD: NORMAL
UROBILINOGEN UR QL: 1 EU/DL (ref 0.2–1)
WBC # BLD AUTO: 13.5 K/UL (ref 4.6–13.2)

## 2019-04-03 PROCEDURE — 59025 FETAL NON-STRESS TEST: CPT

## 2019-04-03 PROCEDURE — 4A0HXCZ MEASUREMENT OF PRODUCTS OF CONCEPTION, CARDIAC RATE, EXTERNAL APPROACH: ICD-10-PCS | Performed by: OBSTETRICS & GYNECOLOGY

## 2019-04-03 PROCEDURE — 88307 TISSUE EXAM BY PATHOLOGIST: CPT

## 2019-04-03 PROCEDURE — 74011250636 HC RX REV CODE- 250/636: Performed by: MIDWIFE

## 2019-04-03 PROCEDURE — 75410000003 HC RECOV DEL/VAG/CSECN EA 0.5 HR

## 2019-04-03 PROCEDURE — 87804 INFLUENZA ASSAY W/OPTIC: CPT

## 2019-04-03 PROCEDURE — 74011250637 HC RX REV CODE- 250/637: Performed by: ADVANCED PRACTICE MIDWIFE

## 2019-04-03 PROCEDURE — 75410000002 HC LABOR FEE PER 1 HR

## 2019-04-03 PROCEDURE — 86900 BLOOD TYPING SEROLOGIC ABO: CPT

## 2019-04-03 PROCEDURE — 81003 URINALYSIS AUTO W/O SCOPE: CPT

## 2019-04-03 PROCEDURE — 99283 EMERGENCY DEPT VISIT LOW MDM: CPT

## 2019-04-03 PROCEDURE — 77030009413 HC ELECTRD SCALP COVD -A

## 2019-04-03 PROCEDURE — 80307 DRUG TEST PRSMV CHEM ANLYZR: CPT

## 2019-04-03 PROCEDURE — 74011250636 HC RX REV CODE- 250/636: Performed by: OBSTETRICS & GYNECOLOGY

## 2019-04-03 PROCEDURE — 85025 COMPLETE CBC W/AUTO DIFF WBC: CPT

## 2019-04-03 PROCEDURE — 65270000029 HC RM PRIVATE

## 2019-04-03 PROCEDURE — 74011250636 HC RX REV CODE- 250/636

## 2019-04-03 PROCEDURE — 77030010848 HC CATH INTUTR PRSS KOLB -B

## 2019-04-03 RX ORDER — SODIUM CHLORIDE 0.9 % (FLUSH) 0.9 %
5-40 SYRINGE (ML) INJECTION AS NEEDED
Status: DISCONTINUED | OUTPATIENT
Start: 2019-04-03 | End: 2019-04-03 | Stop reason: HOSPADM

## 2019-04-03 RX ORDER — PROMETHAZINE HYDROCHLORIDE 25 MG/ML
25 INJECTION, SOLUTION INTRAMUSCULAR; INTRAVENOUS
Status: DISCONTINUED | OUTPATIENT
Start: 2019-04-03 | End: 2019-04-05

## 2019-04-03 RX ORDER — TERBUTALINE SULFATE 1 MG/ML
0.25 INJECTION SUBCUTANEOUS
Status: DISCONTINUED | OUTPATIENT
Start: 2019-04-03 | End: 2019-04-03

## 2019-04-03 RX ORDER — BUTORPHANOL TARTRATE 2 MG/ML
2 INJECTION INTRAMUSCULAR; INTRAVENOUS
Status: DISCONTINUED | OUTPATIENT
Start: 2019-04-03 | End: 2019-04-03 | Stop reason: HOSPADM

## 2019-04-03 RX ORDER — NALBUPHINE HYDROCHLORIDE 10 MG/ML
10 INJECTION, SOLUTION INTRAMUSCULAR; INTRAVENOUS; SUBCUTANEOUS
Status: DISCONTINUED | OUTPATIENT
Start: 2019-04-03 | End: 2019-04-03

## 2019-04-03 RX ORDER — METHYLERGONOVINE MALEATE 0.2 MG/ML
0.2 INJECTION INTRAVENOUS AS NEEDED
Status: DISCONTINUED | OUTPATIENT
Start: 2019-04-03 | End: 2019-04-03 | Stop reason: HOSPADM

## 2019-04-03 RX ORDER — AMOXICILLIN 250 MG
1 CAPSULE ORAL
Status: DISCONTINUED | OUTPATIENT
Start: 2019-04-03 | End: 2019-04-05 | Stop reason: HOSPADM

## 2019-04-03 RX ORDER — FENTANYL/ROPIVACAINE/NS/PF 2MCG/ML-.1
PLASTIC BAG, INJECTION (ML) EPIDURAL
Status: DISPENSED
Start: 2019-04-03 | End: 2019-04-03

## 2019-04-03 RX ORDER — FENTANYL/ROPIVACAINE/NS/PF 2MCG/ML-.1
1-22 PLASTIC BAG, INJECTION (ML) EPIDURAL
Status: DISCONTINUED | OUTPATIENT
Start: 2019-04-03 | End: 2019-04-03 | Stop reason: HOSPADM

## 2019-04-03 RX ORDER — MINERAL OIL
30 OIL (ML) ORAL AS NEEDED
Status: DISCONTINUED | OUTPATIENT
Start: 2019-04-03 | End: 2019-04-03 | Stop reason: HOSPADM

## 2019-04-03 RX ORDER — OXYTOCIN/RINGER'S LACTATE 20/1000 ML
125 PLASTIC BAG, INJECTION (ML) INTRAVENOUS CONTINUOUS
Status: DISCONTINUED | OUTPATIENT
Start: 2019-04-03 | End: 2019-04-03 | Stop reason: HOSPADM

## 2019-04-03 RX ORDER — NALBUPHINE HYDROCHLORIDE 10 MG/ML
2.5 INJECTION, SOLUTION INTRAMUSCULAR; INTRAVENOUS; SUBCUTANEOUS
Status: DISCONTINUED | OUTPATIENT
Start: 2019-04-03 | End: 2019-04-03 | Stop reason: HOSPADM

## 2019-04-03 RX ORDER — DIPHENHYDRAMINE HYDROCHLORIDE 50 MG/ML
25 INJECTION, SOLUTION INTRAMUSCULAR; INTRAVENOUS
Status: DISCONTINUED | OUTPATIENT
Start: 2019-04-03 | End: 2019-04-03 | Stop reason: HOSPADM

## 2019-04-03 RX ORDER — CEPHALEXIN 500 MG/1
500 CAPSULE ORAL 4 TIMES DAILY
COMMUNITY
End: 2019-04-05

## 2019-04-03 RX ORDER — OXYTOCIN/RINGER'S LACTATE 20/1000 ML
999 PLASTIC BAG, INJECTION (ML) INTRAVENOUS ONCE
Status: COMPLETED | OUTPATIENT
Start: 2019-04-03 | End: 2019-04-03

## 2019-04-03 RX ORDER — PHENYLEPHRINE HCL IN 0.9% NACL 1 MG/10 ML
80 SYRINGE (ML) INTRAVENOUS AS NEEDED
Status: DISCONTINUED | OUTPATIENT
Start: 2019-04-03 | End: 2019-04-03 | Stop reason: HOSPADM

## 2019-04-03 RX ORDER — SODIUM CHLORIDE 0.9 % (FLUSH) 0.9 %
5-40 SYRINGE (ML) INJECTION EVERY 8 HOURS
Status: DISCONTINUED | OUTPATIENT
Start: 2019-04-03 | End: 2019-04-03 | Stop reason: HOSPADM

## 2019-04-03 RX ORDER — LANOLIN ALCOHOL/MO/W.PET/CERES
CREAM (GRAM) TOPICAL
COMMUNITY
End: 2020-08-15 | Stop reason: SDUPTHER

## 2019-04-03 RX ORDER — NALOXONE HYDROCHLORIDE 0.4 MG/ML
0.2 INJECTION, SOLUTION INTRAMUSCULAR; INTRAVENOUS; SUBCUTANEOUS AS NEEDED
Status: DISCONTINUED | OUTPATIENT
Start: 2019-04-03 | End: 2019-04-03 | Stop reason: HOSPADM

## 2019-04-03 RX ORDER — OXYTOCIN/0.9 % SODIUM CHLORIDE 30/500 ML
0-20 PLASTIC BAG, INJECTION (ML) INTRAVENOUS
Status: DISCONTINUED | OUTPATIENT
Start: 2019-04-03 | End: 2019-04-05 | Stop reason: HOSPADM

## 2019-04-03 RX ORDER — OXYTOCIN/RINGER'S LACTATE 20/1000 ML
125 PLASTIC BAG, INJECTION (ML) INTRAVENOUS CONTINUOUS
Status: DISCONTINUED | OUTPATIENT
Start: 2019-04-03 | End: 2019-04-03

## 2019-04-03 RX ORDER — METHYLERGONOVINE MALEATE 0.2 MG/ML
0.2 INJECTION INTRAVENOUS AS NEEDED
Status: DISCONTINUED | OUTPATIENT
Start: 2019-04-03 | End: 2019-04-03

## 2019-04-03 RX ORDER — SODIUM CHLORIDE, SODIUM LACTATE, POTASSIUM CHLORIDE, CALCIUM CHLORIDE 600; 310; 30; 20 MG/100ML; MG/100ML; MG/100ML; MG/100ML
125 INJECTION, SOLUTION INTRAVENOUS CONTINUOUS
Status: DISCONTINUED | OUTPATIENT
Start: 2019-04-03 | End: 2019-04-03

## 2019-04-03 RX ORDER — TERBUTALINE SULFATE 1 MG/ML
0.25 INJECTION SUBCUTANEOUS
Status: DISCONTINUED | OUTPATIENT
Start: 2019-04-03 | End: 2019-04-03 | Stop reason: HOSPADM

## 2019-04-03 RX ORDER — MINERAL OIL
30 OIL (ML) ORAL AS NEEDED
Status: DISCONTINUED | OUTPATIENT
Start: 2019-04-03 | End: 2019-04-03

## 2019-04-03 RX ORDER — FENTANYL CITRATE 50 UG/ML
100 INJECTION, SOLUTION INTRAMUSCULAR; INTRAVENOUS ONCE
Status: DISCONTINUED | OUTPATIENT
Start: 2019-04-03 | End: 2019-04-03 | Stop reason: HOSPADM

## 2019-04-03 RX ORDER — OXYTOCIN/RINGER'S LACTATE 20/1000 ML
999 PLASTIC BAG, INJECTION (ML) INTRAVENOUS ONCE
Status: DISCONTINUED | OUTPATIENT
Start: 2019-04-03 | End: 2019-04-03 | Stop reason: HOSPADM

## 2019-04-03 RX ORDER — MISOPROSTOL 100 UG/1
TABLET ORAL
Status: DISCONTINUED
Start: 2019-04-03 | End: 2019-04-03 | Stop reason: WASHOUT

## 2019-04-03 RX ORDER — LIDOCAINE HYDROCHLORIDE 10 MG/ML
20 INJECTION, SOLUTION EPIDURAL; INFILTRATION; INTRACAUDAL; PERINEURAL AS NEEDED
Status: DISCONTINUED | OUTPATIENT
Start: 2019-04-03 | End: 2019-04-03

## 2019-04-03 RX ORDER — NALBUPHINE HYDROCHLORIDE 10 MG/ML
10 INJECTION, SOLUTION INTRAMUSCULAR; INTRAVENOUS; SUBCUTANEOUS
Status: DISCONTINUED | OUTPATIENT
Start: 2019-04-03 | End: 2019-04-03 | Stop reason: HOSPADM

## 2019-04-03 RX ORDER — ZOLPIDEM TARTRATE 5 MG/1
5 TABLET ORAL
Status: DISCONTINUED | OUTPATIENT
Start: 2019-04-03 | End: 2019-04-05 | Stop reason: HOSPADM

## 2019-04-03 RX ORDER — FENTANYL CITRATE 50 UG/ML
INJECTION, SOLUTION INTRAMUSCULAR; INTRAVENOUS
Status: DISCONTINUED
Start: 2019-04-03 | End: 2019-04-03 | Stop reason: WASHOUT

## 2019-04-03 RX ORDER — CIPROFLOXACIN 500 MG/1
500 TABLET ORAL EVERY 12 HOURS
Status: DISCONTINUED | OUTPATIENT
Start: 2019-04-03 | End: 2019-04-05 | Stop reason: HOSPADM

## 2019-04-03 RX ORDER — OXYCODONE AND ACETAMINOPHEN 5; 325 MG/1; MG/1
2 TABLET ORAL
Status: DISCONTINUED | OUTPATIENT
Start: 2019-04-03 | End: 2019-04-05 | Stop reason: HOSPADM

## 2019-04-03 RX ORDER — LIDOCAINE HYDROCHLORIDE 10 MG/ML
20 INJECTION, SOLUTION EPIDURAL; INFILTRATION; INTRACAUDAL; PERINEURAL AS NEEDED
Status: DISCONTINUED | OUTPATIENT
Start: 2019-04-03 | End: 2019-04-03 | Stop reason: HOSPADM

## 2019-04-03 RX ORDER — BUTORPHANOL TARTRATE 2 MG/ML
2 INJECTION INTRAMUSCULAR; INTRAVENOUS
Status: DISCONTINUED | OUTPATIENT
Start: 2019-04-03 | End: 2019-04-03

## 2019-04-03 RX ORDER — SODIUM CHLORIDE, SODIUM LACTATE, POTASSIUM CHLORIDE, CALCIUM CHLORIDE 600; 310; 30; 20 MG/100ML; MG/100ML; MG/100ML; MG/100ML
125 INJECTION, SOLUTION INTRAVENOUS CONTINUOUS
Status: DISCONTINUED | OUTPATIENT
Start: 2019-04-03 | End: 2019-04-03 | Stop reason: HOSPADM

## 2019-04-03 RX ORDER — ACETAMINOPHEN 325 MG/1
650 TABLET ORAL
Status: DISCONTINUED | OUTPATIENT
Start: 2019-04-03 | End: 2019-04-05 | Stop reason: HOSPADM

## 2019-04-03 RX ORDER — LIDOCAINE HYDROCHLORIDE 10 MG/ML
INJECTION INFILTRATION; PERINEURAL
Status: DISCONTINUED
Start: 2019-04-03 | End: 2019-04-03 | Stop reason: WASHOUT

## 2019-04-03 RX ORDER — HYDROMORPHONE HYDROCHLORIDE 1 MG/ML
1 INJECTION, SOLUTION INTRAMUSCULAR; INTRAVENOUS; SUBCUTANEOUS
Status: DISCONTINUED | OUTPATIENT
Start: 2019-04-03 | End: 2019-04-03 | Stop reason: HOSPADM

## 2019-04-03 RX ORDER — IBUPROFEN 400 MG/1
800 TABLET ORAL
Status: DISCONTINUED | OUTPATIENT
Start: 2019-04-03 | End: 2019-04-05 | Stop reason: HOSPADM

## 2019-04-03 RX ADMIN — ACETAMINOPHEN 650 MG: 325 TABLET ORAL at 20:46

## 2019-04-03 RX ADMIN — BUTORPHANOL TARTRATE 2 MG: 2 INJECTION, SOLUTION INTRAMUSCULAR; INTRAVENOUS at 05:36

## 2019-04-03 RX ADMIN — CIPROFLOXACIN 500 MG: 500 TABLET, FILM COATED ORAL at 22:40

## 2019-04-03 RX ADMIN — Medication 125 ML/HR: at 11:06

## 2019-04-03 RX ADMIN — Medication 2 MILLI-UNITS/MIN: at 05:36

## 2019-04-03 RX ADMIN — IBUPROFEN 800 MG: 400 TABLET, FILM COATED ORAL at 20:46

## 2019-04-03 RX ADMIN — Medication 19980 MILLI-UNITS/HR: at 09:48

## 2019-04-03 RX ADMIN — IBUPROFEN 800 MG: 400 TABLET, FILM COATED ORAL at 10:24

## 2019-04-03 NOTE — LACTATION NOTE
Per mom, may try breastfeeding today. Mom educated on breastfeeding basics--hunger cues, feeding on demand, waking baby if baby sleeps too long between feeds, importance of skin to skin, positioning and latching, risk of pacifier use and supplemental feedings, and importance of rooming in--and use of log sheet. Mom also educated on benefits of breastfeeding for herself and baby. Mom verbalized understanding. No questions at this time.

## 2019-04-03 NOTE — PROGRESS NOTES
Labor Progress Note Patient seen, resting through contractions, fetal heart rate and contraction pattern evaluated,  patient examined. Patient Vitals for the past 1 hrs: 
 BP Pulse 04/03/19 0745 104/59 98 Physical Exam: 
Cervical Exam:  7 cm dilated 100% effaced   
-1 station Presenting Part: cephalic Cervical Position: anterior Consistency: Soft Membranes:  scant clear fluid Uterine Activity: Frequency: Every 2-6 minutes and Intensity: moderate Fetal Heart Rate: Baseline: 130 per minute Variability: minimal 
Accelerations: yes Decelerations: none Assessment/Plan: 
39.1 weeks IUP, IOL for severe IUGR, GBS negative Labor  Progressing normally,  Continue IV Pitocin IOL and titrate safely per orders, Epidural anesthesia for pain management at request. Anticipate vaginal delivery.

## 2019-04-03 NOTE — L&D DELIVERY NOTE
Delivery Note    Obstetrician:  Tonia Lui CNM    Assistant: none    Pre-Delivery Diagnosis: Term pregnancy and Pregnancy complicated by: IUGR, + THC and ETOH use in pregnancy    Post-Delivery Diagnosis: Spontaneous vaginal delivery,  Living  infant(s), Female     Intrapartum Event: None    Procedure: Spontaneous vaginal delivery    Epidural: NO    Monitor:  Fetal Heart Tones - External and Uterine Contractions - External    Indications for instrumental delivery: none    Estimated Blood Loss: 200 ml    Episiotomy: none    Laceration(s):  Right  periurethral skid angeles in no need of repair. Laceration(s) repair: NO    Presentation: Cephalic    Fetal Description: cedeno    Fetal Position: Left Occiput Anterior    Birth Weight: 2722 g    Birth Length: 47 cm  Apgar - One Minute: 8    Apgar - Five Minutes: 9    Umbilical Cord: 3 vessels present     Specimens: Cord blood sent to lab for type, Rh, and Rhona' test. Placenta to pathology secondary to history. Complications:  None  CNM to patient room to assess patient's progress. Patient complete and pushing with strong effort. Spontaneous vaginal delivery of fetal head in OA with restitution to ISRA followed by delivery of anterior right shoulder and remainder of fetal body without difficulty. Live female delivered, dried, stimulated and bulb suctioned for vigorous cry. Infant placed on maternal abdomen for skin to skin contact. Delayed cord given for greater than 1 minute. Cord clamped by CNM and cut by aunt. Infant taken to warmer by RN and evaluated by pediatrician. Infant stable and taken to NICU for transition. Refer to delivery summary and nursery notes for NB assessment. Placenta delivered spontaneously intact, Labette [place], 3 VCwith central insertion. Cord blood collected and sent to lab. Placenta to pathology secondary to history. Postpartum IV pitocin initiated. Fundal massage given and minimal clots expressed.  Fundus firm and at umbilicus. Perineal inspection revealed intact perineum with small right periurethral skid angeles in no need of repair. Mom and infant stable. Mom stable and to postpartum care. Sponges, sharps and instrument count correct at end of procedure. Cord Blood Results:   Information for the patient's :  Ginger Painter [[id number]]   No results found for: PCTABR, PCTDIG, BILI, [address] Fer[address]    Prenatal Labs:     Lab Results   Component Value Date/Time    ABO/Rh(D) O POSITIVE 2019 03:40 AM    HBsAg, External Negative 2019    HIV, External Non-reactive 2019    Rubella, External Immune 2019    RPR, External Non-reactive 2019    Gonorrhea, External negative 2014    Chlamydia, External negative 2014    GrBStrep, External Negative 2019        Attending Attestation: I was present and scrubbed for the entire procedure    Signed By:  Keyon Pena CNM     April 3, 2019          Delivery Summary    Patient: Zacarias Hinkle MRN: [id number]  SSN: [id number]    YOB: 1995  Age: 24 y.o. Sex: female       Information for the patient's :  Ginger Painter [[id number]]       Labor Events:    Labor:     Rupture Date: 2019   Rupture Time: 6:30 PM   Rupture Type SROM   Amniotic Fluid Volume:      Amniotic Fluid Description:       Induction:         Augmentation:     Labor Events:       Cervical Ripening:           Delivery Events:  Episiotomy:     Laceration(s):       Repaired:      Number of Repair Packets:     Suture Type and Size:       Estimated Blood Loss (ml):  ml       Delivery Date: 4/3/2019    Delivery Time: 9:40 AM  Delivery Type:    Sex:  Female     Gestational Age: 36w3d   Delivery Clinician:     Living Status:     Delivery Location:              [facility]  One minute Five minutes Ten minutes   Skin color:            Heart rate:            Grimace:            Muscle tone:            Breathing:             Totals: Presentation:      Position:        Resuscitation Method:        Meconium Stained:        Cord Information:    Complications:    Cord around:    Delayed cord clamping? Cord clamped date/time:   Disposition of Cord Blood:      Blood Gases Sent?:      Placenta:  Date/Time:    Removal:        Appearance:       [place] Measurements:  Birth Weight: 2.722 kg      Birth Length: 47 cm      Head Circumference: 33.5 cm      Chest Circumference: 31.5 cm     Abdominal Girth: 28 cm    Other Providers:    , Obstetrician;Primary Nurse;Primary  Nurse;Nicu Nurse;Neonatologist;Anesthesiologist;Crna;Nurse Practitioner;Midwife;Nursery Nurse           Group B Strep:   Lab Results   Component Value Date/Time    GrBStrep, External Negative 2019     Information for the patient's :  Fabricio Wynne [[id number]]   No results found for: ABORH, PCTABR, PCTDIG, BILI, ABORHEXT, ABORH    No results for input(s): PCO2CB, PO2CB, HCO3I, SO2I, IBD, PTEMPI, SPECTI, PHICB, ISITE, IDEV, IALLEN in the last 72 hours.

## 2019-04-03 NOTE — PROGRESS NOTES
0715 Bedside and Verbal shift change report given to DONTAE Charles (oncoming nurse) by AMANDA Montemayor RN (offgoing nurse). Report included the following information SBAR, Kardex, Intake/Output, MAR and Recent Results. 2342 Patient is resting in left lateral position, POC reviewed, denies needs at this time [facility] CASSIA at bedside, SVE 7/100/-1. Requesting epidural, LR bolus initiated 3342 Dr. Trinidad Pena paged via [facility] with call back, coming to unit for epidural  
 
2644 Patient reporting pressure and urge to push, SVE anterior lip/100/+1. Dr. Trinidad Pena at bedside, unable to sit for epidural 
 
0900 
 
0940 Spontaneous vaginal delivery of a viable female infant, shoulders delivered without difficulty, no nuchal noted, infant dried and placed skin to skin on mothers abdomen, tactile stimulation and bulb suctioning provided, delayed cord clamping, cord clamped by CNM and cut by family member. Infant then taken to warmer for assessment by BERNADETTE (Dr. Jluis Montanez). Care of patient assumed by NICU. 8/9 apgars. 8156 spontaneous delivery of a normal looking and intact placenta. , perineum intact. 1035 Patient reports having to void, states she cannot make it to the bathroom. Placed on bed pan, void large amount of clear, yellow urine. Roxanna care provided, pad changed. Patient refuses ice pack. 1300 TRANSFER - OUT REPORT: 
 
Verbal report given to YOGI Pereira RN(name) on [facility]  being transferred to postpartum (unit) for routine progression of care Report consisted of patients Situation, Background, Assessment and  
Recommendations(SBAR). Information from the following report(s) SBAR, Kardex, Intake/Output, MAR and Recent Results was reviewed with the receiving nurse. Lines:  
Peripheral IV 04/03/19 Left;Posterior Hand (Active) Site Assessment Clean, dry, & intact 4/3/2019  7:30 AM  
Phlebitis Assessment 0 4/3/2019  7:30 AM  
Infiltration Assessment 0 4/3/2019  7:30 AM  
 Dressing Status Clean, dry, & intact 4/3/2019  7:30 AM  
Dressing Type Tape;Transparent 4/3/2019  7:30 AM  
Hub Color/Line Status Pink; Infusing 4/3/2019  7:30 AM  
Action Taken Open ports on tubing capped 4/3/2019  7:30 AM  
Alcohol Cap Used Yes 4/3/2019  7:30 AM  
  
 
Opportunity for questions and clarification was provided. Patient transported with: 
 Registered Nurse

## 2019-04-03 NOTE — PROGRESS NOTES
1905- Bedside report received from YOGI Posada RN . Pt. Stable. Needs addressed. Callbell within reach. 2105- Pt. noted with elevated temperature reading of 100.7 orally. And 102 axillary. Pt. Complains of spells of chills and overheated. Headache rated at 5/10. Tylenol and motrin administered for pain and elevated temperature. Discussed with Charanjit Jacobs CNM, orders for flu swab and continued monitoring of vital signs. Will inform if temperature does not trend down, signs and symptoms worsen, or flu swab returns positive. Pt. Rachel Kumar at bedside by family and baby. AAOx4. VSS. Will continue to monitor. Pain 5/10. Educated on pain management. Pain medication administered as  ordered. Educated on plan of care. No further questions on concerns at this time. Fundus firm at U-1, small rubra lochia. No clots noted. Assessment complete. Callbell within reach. Bed in lowest position. 2315- Bedside and Verbal shift change report given to LUIS Hernandez RN  (oncoming nurse) by ISRAEL Alvarado (offgoing nurse). Report included the following information SBAR, Kardex, Intake/Output, MAR and Recent Results.

## 2019-04-03 NOTE — ANESTHESIA PREPROCEDURE EVALUATION
Relevant Problems No relevant active problems Anesthetic History Review of Systems / Medical History Patient summary reviewed, nursing notes reviewed and pertinent labs reviewed Pulmonary Asthma Neuro/Psych Cardiovascular Exercise tolerance: >4 METS 
  
GI/Hepatic/Renal 
Within defined limits Endo/Other Diabetes: type 2 Other Findings Physical Exam 
 
Airway Mallampati: II 
TM Distance: 4 - 6 cm Neck ROM: normal range of motion Mouth opening: Normal 
 
 Cardiovascular Regular rate and rhythm,  S1 and S2 normal,  no murmur, click, rub, or gallop Dental 
No notable dental hx Pulmonary Abdominal 
GI exam deferred Other Findings Anesthetic Plan ASA: 2 Risk and benefits fully explained to the patient including bleeding, headache, nerve damage, infection, nausea, back pain, and hemodynamic changes. Patient understands and agrees to the procedure. Post-op pain plan if not by surgeon: indwelling epidural catheter Induction: Intravenous Anesthetic plan and risks discussed with: Patient Patient left  [facility], because the anesthesia provider could not place the epidural after multiple attempts.

## 2019-04-03 NOTE — H&P
History & Physical 
 
Name: Tyrell Stephen MRN: [id number]  SSN: [id number] YOB: 1995  Age: 24 y.o. Sex: female Subjective:  
 
Estimated Date of Delivery: 19 OB History   
7 Para 6 Term  
6  AB Living 5  
  
 SAB  
   
 TAB Ectopic Molar Multiple  
0 Live Births 6 MsMarielle Groves is admitted with pregnancy at 39w1d for SROM after beginning IOL at [facility] this evening at 1800. for IUGR (AC 2%, BPD 1%). Prenatal course was complicated by fetal growth restriction, pyelonephritis and sickle cell trait, Late to care and limited prenatal care, depression and anxiety complicated by the death of the FOB early in the pregnancy. Information from care everywhere in [facility], and awaiting records from [facility]. Allergies Allergen Reactions  Banana Itching  Coconut Itching  Iodine Swelling Pt states she has had betadine in the past and can have that  Kiwi Itching  Shellfish Derived Swelling Objective:  
 
Vitals: 
Vitals:  
 19 0256 19 4700 BP:  111/76 Pulse:  100 Resp:  18 Temp:  98.9 °F (37.2 °C) SpO2:  100% Weight: 56.2 kg (124 lb) Height: 5' 2\" (1.575 m) Physical Exam: 
Patient without distress. Heart: Regular rate and rhythm or S1S2 present Lung: clear to auscultation throughout lung fields, no wheezes, no rales, no rhonchi and normal respiratory effort Abdomen: soft, nontender Fundus: soft and non tender Perineum: blood absent, amniotic fluid present Cervical Exam: 4 cm dilated 100% effaced   
-1 station Membranes:  Spontaneous Rupture of Membranes; Amniotic Fluid: clear fluid Fetal Heart Rate & Contraction pattern: Baseline: 150 per minute Variability: moderate Accelerations: yes Decelerations: none Uterine contractions: irregular, one ctx noted on toco 
 
Prenatal Labs:  
Lab Results Component Value Date/Time Rubella, External immune 09/29/2015 GrBStrep, External positive 06/24/2014 HBsAg, External negative 09/29/2015 HIV, External negative 09/29/2015 Gonorrhea, External negative 06/24/2014 Chlamydia, External negative 06/24/2014 Assessment/Plan:  
 
Plan: Admit for Reassuring fetal status, Labor  Progressing normally Continue expectant management, Continue plan for vaginal delivery. Group B Strep was negative. Signed By:  Kirk Marsh CNM April 3, 2019

## 2019-04-03 NOTE — PROGRESS NOTES
0250-Pt arrives to unit as a AMA pt from [facility]. Pt was being induced for IUGR at [facility] per EVMS recommendation. Pt  at 39w1d gestation. Clean catch urine collected. TOCO and US applied. 0258-CNM INEZ Pinon at bedside to examine pt. SVE 4/100/-1. Nitrazine positive. Pt states fluid started leaking at  at [facility]. Admission orders given at this time. 0330-Pt moved to Room 4. Admission Assessment completed at this time. See flow sheet for details. 0530-SVE 5-6/100/-2 
 
0715-Bedside and verbal shift change report given to DONTAE Goldstein (oncoming nurse) by AMANDA Manzano RN (offgoing nurse). Report included the following information SBAR, Kardex and MAR.

## 2019-04-04 PROCEDURE — 74011250637 HC RX REV CODE- 250/637: Performed by: OBSTETRICS & GYNECOLOGY

## 2019-04-04 PROCEDURE — 65270000029 HC RM PRIVATE

## 2019-04-04 PROCEDURE — 74011250637 HC RX REV CODE- 250/637: Performed by: ADVANCED PRACTICE MIDWIFE

## 2019-04-04 RX ORDER — IBUPROFEN 800 MG/1
800 TABLET ORAL
Qty: 30 TAB | Refills: 0 | Status: SHIPPED | OUTPATIENT
Start: 2019-04-04 | End: 2020-05-27

## 2019-04-04 RX ORDER — CIPROFLOXACIN 500 MG/1
500 TABLET ORAL EVERY 12 HOURS
Qty: 10 TAB | Refills: 0 | Status: SHIPPED | OUTPATIENT
Start: 2019-04-04 | End: 2020-08-15

## 2019-04-04 RX ADMIN — CIPROFLOXACIN 500 MG: 500 TABLET, FILM COATED ORAL at 09:01

## 2019-04-04 RX ADMIN — ACETAMINOPHEN 650 MG: 325 TABLET ORAL at 08:08

## 2019-04-04 RX ADMIN — CIPROFLOXACIN 500 MG: 500 TABLET, FILM COATED ORAL at 20:47

## 2019-04-04 RX ADMIN — ACETAMINOPHEN 650 MG: 325 TABLET ORAL at 20:46

## 2019-04-04 RX ADMIN — ACETAMINOPHEN 650 MG: 325 TABLET ORAL at 14:30

## 2019-04-04 NOTE — ROUTINE PROCESS
Patient teaching regarding home medications,(this writer observed bottle of antibiotics on the bedside table )  Patient informed that we are giving her antibiotics that the MD prescribed here, patient stated to this writer that she is not taking her home antibiotic she wanted to show the Doctor what she was taking and explain why she is no longer taking them. Patient education is continuous, regarding medication safety and compliance is on going.

## 2019-04-04 NOTE — DISCHARGE INSTRUCTIONS
POST DELIVERY DISCHARGE INSTRUCTIONS    Name: Sapna Tirado  YOB: 1995  Primary Diagnosis: Active Problems:    IUP (intrauterine pregnancy), incidental (7/3/2014)      At high risk for complications of intrauterine pregnancy (IUP) (4/3/2019)        General:     Diet/Diet Restrictions:  Eight 8-ounce glasses of fluid daily (water, juices); avoid excessive caffeine intake. Meals/snacks as desired which are high in fiber and carbohydrates and low in fat and cholesterol. Physical Activity / Restrictions / Safety:     Avoid heavy lifting, no more than the baby alone (not the baby in the car seat). Avoid intercourse until you are seen at your postpartum visit. No douching or tampon use. Check with obstetrician before starting or resuming an exercise program.         Discharge Instructions/Special Treatment/Home Care Needs:     Continue prenatal vitamins. Continue to use squirt bottle with warm water on your perineum after each bathroom use until bleeding stops. Call your doctor for the following:     Fever over 101 degrees by mouth. Vaginal bleeding that soaks two pads per hour for more than one hour. Red streaks or increased swelling of legs, painful red streaks on your breast.  If you feel extremely anxious or overwhelmed. If you have thoughts of harming yourself and/or your baby. Pain Management:     Pain Management:   Take Acetaminophen (Tylenol) or Ibuprofen (Advil, Motrin), as directed for pain. Use a warm Sitz bath 3 times daily to relieve perineal or hemorrhoidal discomfort. For hemorrhoidal discomfort, use Tucks and Anusol cream as needed and directed.     Follow-Up Care:     Appointment with MD:   Follow-up Appointments   Procedures    FOLLOW UP VISIT Appointment in: 6 Weeks     Standing Status:   Standing     Number of Occurrences:   1     Order Specific Question:   Appointment in     Answer:   Dottie Mares     Telephone number: [phone]      Signed By: Mason Seaman CNM

## 2019-04-04 NOTE — PROGRESS NOTES
Pt refused lab draw. Pt stated she was not going to be stuck again and blood would have to come from IV. Unsuccessful in obtaining blood from either port of IV and pt still refuses lab.

## 2019-04-04 NOTE — PROGRESS NOTES
Progress Note Patient: Irma Harris MRN: [id number] YOB: 1995  Age: 24 y.o. Subjective:  
 
Postpartum Day: 1 The patient is feeling well. Pain is  well controlled with current medications. Urinary output is adequate. Baby is feeding via both breast and bottle  without difficulty. Objective:  
  
Patient Vitals for the past 12 hrs: 
 Temp Pulse Resp BP SpO2  
04/04/19 0757 99.7 °F (37.6 °C) 75 18 110/76 100 % 04/04/19 0324 97.6 °F (36.4 °C) 81 16 101/54 100 % 04/04/19 0000 97.9 °F (36.6 °C) 78 17 93/54 100 % General:    alert, cooperative, no distress Lochia:  appropriate Uterine Fundus:   firm @ umbilicus Perineum:  well-approximated DVT Evaluation:  No evidence of DVT seen on physical exam. 
Negative Margaret's sign. Lab/Data Review: 
Recent Results (from the past 24 hour(s)) INFLUENZA A & B AG (RAPID TEST) Collection Time: 04/03/19  9:25 PM  
Result Value Ref Range Influenza A Antigen NEGATIVE  NEG Influenza B Antigen NEGATIVE  NEG All lab results for the last 24 hours reviewed. Assessment:  
 
Delivery: spontaneous vaginal delivery Plan:  
 
Doing well postpartum vaginal delivery. Continue current postpartum care. Encouraged hydration, nutrition and ambulation. Plan DC home tomorrow pending case management consult. Will f/u in 4-6 weeks at [facility]. for pp visit. Signed By: Gerhardt Dupont, CNM April 4, 2019

## 2019-04-04 NOTE — PROGRESS NOTES
Bedside and Verbal shift change report given to YOGI PereiraRN (oncoming nurse) by Astrid Corral RN (offgoing nurse). Report given with SBAR, Kardex and MAR.

## 2019-04-04 NOTE — PROGRESS NOTES
Chart reviewed cm met with patient at bedside, discussed reason for consult, pt stated she started [facility] at 20 weeks because she didn't know she was pregnant,denies drug use during pregnancy stated it has been years since she used drugs back in 2013 during stressful time in her life, pt does admit to drinking alcohol but not during pregnancy,both infant and mother drug screens negative,infants meconium pending,pt states she is dealing well with death of FOB which is also the father of child next to youngest, has support from his family and her mother whom pt states she will be staying with after discharge, ,states she has baby supplies and car seat plans to schedule f/u appoint with children's clinic,active with [facility], if infant meconium drug screen comes back positive CPS needs to be notified. at this time no cm needs but will remain available.

## 2019-04-04 NOTE — PROGRESS NOTES
Bedside and Verbal shift change report given to ANTHONY Logan RN  (oncoming nurse) by YOGI Cornelius RN  (offgoing nurse). Report included the following information SBAR, Kardex, Intake/Output, MAR and Recent Results. Patient continues to refuse to have her blood drawn. Patient educated on reason why blood needs to be drawn. Patient verbalized understanding but continues to refuse stating she doesn't need it and it will be fine.

## 2019-04-04 NOTE — PROGRESS NOTES
Bedside and Verbal shift change report given to [address] (oncoming nurse) by Angelica Maki RN (offgoing nurse). Report included the following information SBAR, Kardex, MAR, Recent Results and Med Rec Status.

## 2019-04-04 NOTE — PROGRESS NOTES
1425 Pt noted to have elevated temperature  102.0 Tylenol 650 mg given p.o. [facility] Midwife notified patient is refusing any type of lab work. Patient educated on the importance of necessary testing following delivery however patient continues to be non compliant with lab draws. Currently on antibiotics P. O. which are scheduled. Will repeat Temperate. Observation is ongoing.

## 2019-04-05 VITALS
HEART RATE: 79 BPM | WEIGHT: 124 LBS | OXYGEN SATURATION: 100 % | RESPIRATION RATE: 17 BRPM | TEMPERATURE: 99.9 F | SYSTOLIC BLOOD PRESSURE: 103 MMHG | DIASTOLIC BLOOD PRESSURE: 63 MMHG | BODY MASS INDEX: 22.82 KG/M2 | HEIGHT: 62 IN

## 2019-04-05 PROCEDURE — 74011250637 HC RX REV CODE- 250/637: Performed by: OBSTETRICS & GYNECOLOGY

## 2019-04-05 RX ADMIN — CIPROFLOXACIN 500 MG: 500 TABLET, FILM COATED ORAL at 09:55

## 2019-04-05 NOTE — PROGRESS NOTES
Bedside and Verbal shift change report given to INEZ Bustillos RN  (oncoming nurse) by ANTHONY Nolan RN  (offgoing nurse). Report included the following information SBAR, Kardex, Intake/Output, MAR and Recent Results.  \

## 2019-04-05 NOTE — PROGRESS NOTES
6609 Bedside and Verbal shift change report given to [facility], RN (oncoming nurse) by ANTHONY Thomas RN (offgoing nurse). Report included the following information SBAR, Kardex, Intake/Output, MAR and Recent Results. Patient resting in room with family at bedside. Call bell within reach. No further needs at this time. Will continue to monitor. 1330 Patient discharged via wheelchair per protocol. Patient in stable condition. Discharge education reviewed and packet given to patient. Patient verbalized understanding of discharge instructions. E-sign completed. Armbands removed and given to patient. No further needs reported at this time.

## 2019-04-05 NOTE — LACTATION NOTE
Spoke with Sheila Finney CNM regarding pts request for a breastpump prescription. CNM stated she will be rounding around lunchtime. Breastpump prescription faxed to [facility]. Confirmation given to patient.

## 2020-05-27 PROBLEM — O09.42 GRAND MULTIPARITY WITH CURRENT PREGNANCY IN SECOND TRIMESTER: Status: ACTIVE | Noted: 2019-01-09

## 2020-05-27 PROBLEM — Z86.32 HISTORY OF GESTATIONAL DIABETES IN PRIOR PREGNANCY, CURRENTLY PREGNANT IN SECOND TRIMESTER: Status: ACTIVE | Noted: 2020-05-27

## 2020-05-27 PROBLEM — O99.519 ASTHMA DURING PREGNANCY: Status: ACTIVE | Noted: 2020-05-27

## 2020-05-27 PROBLEM — F17.200 CURRENT SMOKER: Status: ACTIVE | Noted: 2020-05-27

## 2020-05-27 PROBLEM — J45.909 ASTHMA DURING PREGNANCY: Status: ACTIVE | Noted: 2020-05-27

## 2020-05-27 PROBLEM — Z87.59 HISTORY OF PRIOR PREGNANCY WITH IUGR NEWBORN: Status: ACTIVE | Noted: 2020-05-27

## 2020-05-27 PROBLEM — F12.90 MARIJUANA USE: Status: ACTIVE | Noted: 2020-05-27

## 2020-05-27 PROBLEM — O23.02 PYELONEPHRITIS AFFECTING PREGNANCY IN SECOND TRIMESTER: Status: RESOLVED | Noted: 2019-01-08 | Resolved: 2020-05-27

## 2020-05-27 PROBLEM — O09.90 AT HIGH RISK FOR COMPLICATIONS OF INTRAUTERINE PREGNANCY (IUP): Status: RESOLVED | Noted: 2019-04-03 | Resolved: 2020-05-27

## 2020-05-27 PROBLEM — O09.292 HISTORY OF GESTATIONAL DIABETES IN PRIOR PREGNANCY, CURRENTLY PREGNANT IN SECOND TRIMESTER: Status: ACTIVE | Noted: 2020-05-27

## 2020-05-27 PROBLEM — O09.892 HISTORY OF PRETERM DELIVERY, CURRENTLY PREGNANT IN SECOND TRIMESTER: Status: ACTIVE | Noted: 2020-05-27

## 2020-05-27 PROBLEM — Z3A.27 27 WEEKS GESTATION OF PREGNANCY: Status: RESOLVED | Noted: 2019-01-08 | Resolved: 2020-05-27

## 2020-06-22 LAB
CHLAMYDIA, EXTERNAL: NEGATIVE
N. GONORRHEA, EXTERNAL: NEGATIVE

## 2020-08-14 PROBLEM — O09.33 INSUFFICIENT PRENATAL CARE IN THIRD TRIMESTER: Status: ACTIVE | Noted: 2020-08-14

## 2020-08-14 PROBLEM — O36.5930 POOR FETAL GROWTH AFFECTING MANAGEMENT OF MOTHER IN THIRD TRIMESTER: Status: ACTIVE | Noted: 2020-08-14

## 2020-08-14 PROBLEM — O99.013 ANEMIA IN PREGNANCY, THIRD TRIMESTER: Status: ACTIVE | Noted: 2020-08-14

## 2020-08-22 PROBLEM — O09.899 SUSCEPTIBLE TO VARICELLA (NON-IMMUNE), CURRENTLY PREGNANT: Status: ACTIVE | Noted: 2020-08-22

## 2020-08-22 PROBLEM — Z28.39 SUSCEPTIBLE TO VARICELLA (NON-IMMUNE), CURRENTLY PREGNANT: Status: ACTIVE | Noted: 2020-08-22

## 2020-08-22 LAB
HBSAG, EXTERNAL: NEGATIVE
HIV, EXTERNAL: NON REACTIVE
RPR, EXTERNAL: NORMAL
RUBELLA, EXTERNAL: NORMAL

## 2020-08-25 PROBLEM — O23.43 URINARY TRACT INFECTION IN MOTHER DURING THIRD TRIMESTER OF PREGNANCY: Status: ACTIVE | Noted: 2020-08-25

## 2020-09-21 PROBLEM — O99.013 ANEMIA COMPLICATING PREGNANCY IN THIRD TRIMESTER: Status: ACTIVE | Noted: 2020-09-21

## 2020-09-21 RX ORDER — SODIUM CHLORIDE 0.9 % (FLUSH) 0.9 %
10 SYRINGE (ML) INJECTION AS NEEDED
Status: CANCELLED | OUTPATIENT
Start: 2020-09-28

## 2020-09-21 RX ORDER — HEPARIN 100 UNIT/ML
300-500 SYRINGE INTRAVENOUS AS NEEDED
Status: CANCELLED
Start: 2020-09-28

## 2020-09-21 RX ORDER — EPINEPHRINE 1 MG/ML
0.3 INJECTION, SOLUTION, CONCENTRATE INTRAVENOUS AS NEEDED
Status: CANCELLED | OUTPATIENT
Start: 2020-09-28

## 2020-09-21 RX ORDER — DIPHENHYDRAMINE HYDROCHLORIDE 50 MG/ML
50 INJECTION, SOLUTION INTRAMUSCULAR; INTRAVENOUS AS NEEDED
Status: CANCELLED
Start: 2020-09-28

## 2020-09-21 RX ORDER — ONDANSETRON 2 MG/ML
8 INJECTION INTRAMUSCULAR; INTRAVENOUS AS NEEDED
Status: CANCELLED | OUTPATIENT
Start: 2020-09-28

## 2020-09-21 RX ORDER — ACETAMINOPHEN 325 MG/1
650 TABLET ORAL AS NEEDED
Status: CANCELLED
Start: 2020-09-28

## 2020-09-21 RX ORDER — ALBUTEROL SULFATE 0.83 MG/ML
2.5 SOLUTION RESPIRATORY (INHALATION) AS NEEDED
Status: CANCELLED
Start: 2020-09-28

## 2020-09-21 RX ORDER — DIPHENHYDRAMINE HYDROCHLORIDE 50 MG/ML
25 INJECTION, SOLUTION INTRAMUSCULAR; INTRAVENOUS AS NEEDED
Status: CANCELLED
Start: 2020-09-28

## 2020-09-21 RX ORDER — HYDROCORTISONE SODIUM SUCCINATE 100 MG/2ML
100 INJECTION, POWDER, FOR SOLUTION INTRAMUSCULAR; INTRAVENOUS AS NEEDED
Status: CANCELLED | OUTPATIENT
Start: 2020-09-28

## 2020-09-21 RX ORDER — SODIUM CHLORIDE 9 MG/ML
10 INJECTION INTRAMUSCULAR; INTRAVENOUS; SUBCUTANEOUS AS NEEDED
Status: CANCELLED | OUTPATIENT
Start: 2020-09-28

## 2020-09-21 RX ORDER — SODIUM CHLORIDE 9 MG/ML
25 INJECTION, SOLUTION INTRAVENOUS CONTINUOUS
Status: CANCELLED | OUTPATIENT
Start: 2020-09-28

## 2020-09-22 LAB — GRBS, EXTERNAL: NEGATIVE

## 2020-09-28 ENCOUNTER — HOSPITAL ENCOUNTER (OUTPATIENT)
Dept: INFUSION THERAPY | Age: 25
End: 2020-09-28

## 2020-09-28 DIAGNOSIS — O99.013 ANEMIA COMPLICATING PREGNANCY IN THIRD TRIMESTER: ICD-10-CM

## 2020-09-30 ENCOUNTER — HOSPITAL ENCOUNTER (OUTPATIENT)
Age: 25
Discharge: HOME | End: 2020-09-30
Attending: OBSTETRICS & GYNECOLOGY | Admitting: OBSTETRICS & GYNECOLOGY
Payer: MEDICAID

## 2020-09-30 VITALS
HEART RATE: 90 BPM | TEMPERATURE: 98.7 F | RESPIRATION RATE: 14 BRPM | HEIGHT: 62 IN | WEIGHT: 120 LBS | BODY MASS INDEX: 22.08 KG/M2 | DIASTOLIC BLOOD PRESSURE: 60 MMHG | SYSTOLIC BLOOD PRESSURE: 104 MMHG

## 2020-09-30 PROBLEM — Z34.90 PREGNANCY: Status: ACTIVE | Noted: 2020-09-30

## 2020-09-30 LAB
APPEARANCE UR: CLEAR
BILIRUB UR QL: NEGATIVE
COLOR UR: YELLOW
GLUCOSE UR QL STRIP.AUTO: NEGATIVE MG/DL
KETONES UR-MCNC: NEGATIVE MG/DL
LEUKOCYTE ESTERASE UR QL STRIP: ABNORMAL
NITRITE UR QL: NEGATIVE
PH UR: 6.5 [PH] (ref 5–9)
PROT UR QL: 100 MG/DL
RBC # UR STRIP: ABNORMAL /UL
SERVICE CMNT-IMP: ABNORMAL
SP GR UR: 1.02 (ref 1–1.02)
UROBILINOGEN UR QL: 1 EU/DL (ref 0.2–1)

## 2020-09-30 PROCEDURE — 99283 EMERGENCY DEPT VISIT LOW MDM: CPT

## 2020-09-30 PROCEDURE — 87086 URINE CULTURE/COLONY COUNT: CPT

## 2020-09-30 PROCEDURE — 81003 URINALYSIS AUTO W/O SCOPE: CPT

## 2020-09-30 PROCEDURE — 74011250637 HC RX REV CODE- 250/637: Performed by: OBSTETRICS & GYNECOLOGY

## 2020-09-30 PROCEDURE — 59025 FETAL NON-STRESS TEST: CPT

## 2020-09-30 RX ORDER — ALBUTEROL SULFATE 0.83 MG/ML
1.25 SOLUTION RESPIRATORY (INHALATION) AS NEEDED
COMMUNITY

## 2020-09-30 RX ORDER — VITAMIN A ACETATE, BETA CAROTENE, ASCORBIC ACID, CHOLECALCIFEROL, .ALPHA.-TOCOPHEROL ACETATE, DL-, THIAMINE MONONITRATE, RIBOFLAVIN, NIACINAMIDE, PYRIDOXINE HYDROCHLORIDE, FOLIC ACID, CYANOCOBALAMIN, CALCIUM CARBONATE, FERROUS FUMARATE, ZINC OXIDE, CUPRIC OXIDE 3080; 12; 120; 400; 1; 1.84; 3; 20; 22; 920; 25; 200; 27; 10; 2 [IU]/1; UG/1; MG/1; [IU]/1; MG/1; MG/1; MG/1; MG/1; MG/1; [IU]/1; MG/1; MG/1; MG/1; MG/1; MG/1
1 TABLET, FILM COATED ORAL DAILY
COMMUNITY

## 2020-09-30 RX ORDER — ACETAMINOPHEN 500 MG
1000 TABLET ORAL ONCE
Status: COMPLETED | OUTPATIENT
Start: 2020-09-30 | End: 2020-09-30

## 2020-09-30 RX ORDER — LANOLIN ALCOHOL/MO/W.PET/CERES
325 CREAM (GRAM) TOPICAL EVERY OTHER DAY
COMMUNITY

## 2020-09-30 RX ADMIN — ACETAMINOPHEN 1000 MG: 500 TABLET ORAL at 17:02

## 2020-09-30 NOTE — PROGRESS NOTES
1540- Patient presents to labor and delivery  L6 36weeks 6days for leakage of fluid, leg and back pain. Patient rates pain 8/10, states she is sore in her inner thighs and right calf, has taken tylenol @ 1130am with no relief. Patient denies bleeding states, no unusual discharge, no recent sex, last ate @ 56 a bowl of cereal.  States she had 2 gushes of fluid at home last nignt @ 2030 after using the restroom. Patient says pain is in her inner thighs, both are sore and her right calf is sore. Calf shows no swelling, patient able to ambulate. 26- Paged Dr. Rolly Broderick aware of patient, orders given    1700- Patient resting in bed with eyes closed, no distress noted     1734- Patient resting in bed with eyes closed, no distress noted    1738- Patient off monitor for discharge, patient states she has an appointment with Dr. Letha Nihcols tomorrow. 1755- Discharge instructions given to patient, patient verbalized understanding.

## 2020-09-30 NOTE — DISCHARGE INSTRUCTIONS
Prenatal Discharge Instructions  Follow up appointment: Ensure to keep your scheduled OB appointment tomorrow with Dr. Teresa West    Diet: As tolerated, ensure to drink plenty of fluids especially water to remain hydrated    Activity: As tolerated, elevate feet and legs to help keep swelling to a minimum     Medications:  As prescribed     Call your physician or return to Labor and Delivery if any of the following symptoms occur:   Signs of labor (contractions every 5-10 minutes for 1 hour)   Vaginal bleeding   Rupture of amniotic membranes (water breaks)   Fever   Decreased fetal movement (less than 5-10 movements in 1 hour)

## 2020-10-01 LAB
BACTERIA SPEC CULT: NORMAL
CC UR VC: NORMAL
SERVICE CMNT-IMP: NORMAL

## 2020-10-05 ENCOUNTER — APPOINTMENT (OUTPATIENT)
Dept: INFUSION THERAPY | Age: 25
End: 2020-10-05

## 2020-10-05 ENCOUNTER — HOSPITAL ENCOUNTER (INPATIENT)
Age: 25
LOS: 4 days | Discharge: HOME | DRG: 540 | End: 2020-10-09
Attending: OBSTETRICS & GYNECOLOGY | Admitting: OBSTETRICS & GYNECOLOGY
Payer: MEDICAID

## 2020-10-05 DIAGNOSIS — O99.013 ANEMIA COMPLICATING PREGNANCY IN THIRD TRIMESTER: ICD-10-CM

## 2020-10-05 DIAGNOSIS — Z98.891 S/P CESAREAN SECTION: Primary | ICD-10-CM

## 2020-10-05 PROBLEM — O36.5990 IUGR (INTRAUTERINE GROWTH RESTRICTION) AFFECTING CARE OF MOTHER: Status: ACTIVE | Noted: 2020-10-05

## 2020-10-05 LAB
BASOPHILS # BLD: 0 K/UL (ref 0–0.1)
BASOPHILS NFR BLD: 0 % (ref 0–2)
DIFFERENTIAL METHOD BLD: ABNORMAL
EOSINOPHIL # BLD: 0.1 K/UL (ref 0–0.4)
EOSINOPHIL NFR BLD: 2 % (ref 0–5)
ERYTHROCYTE [DISTWIDTH] IN BLOOD BY AUTOMATED COUNT: 15.2 % (ref 11.6–14.5)
HCT VFR BLD AUTO: 26.6 % (ref 35–45)
HGB BLD-MCNC: 8.3 G/DL (ref 12–16)
LYMPHOCYTES # BLD: 2.1 K/UL (ref 0.9–3.6)
LYMPHOCYTES NFR BLD: 34 % (ref 21–52)
MCH RBC QN AUTO: 23.5 PG (ref 24–34)
MCHC RBC AUTO-ENTMCNC: 31.2 G/DL (ref 31–37)
MCV RBC AUTO: 75.4 FL (ref 74–97)
MONOCYTES # BLD: 0.5 K/UL (ref 0.05–1.2)
MONOCYTES NFR BLD: 8 % (ref 3–10)
NEUTS SEG # BLD: 3.5 K/UL (ref 1.8–8)
NEUTS SEG NFR BLD: 56 % (ref 40–73)
PLATELET # BLD AUTO: 246 K/UL (ref 135–420)
PMV BLD AUTO: 10.4 FL (ref 9.2–11.8)
RBC # BLD AUTO: 3.53 M/UL (ref 4.2–5.3)
WBC # BLD AUTO: 6.2 K/UL (ref 4.6–13.2)

## 2020-10-05 PROCEDURE — 77030028565 HC CATH CERV RIPNG BLN COOK -B

## 2020-10-05 PROCEDURE — 3E0P7VZ INTRODUCTION OF HORMONE INTO FEMALE REPRODUCTIVE, VIA NATURAL OR ARTIFICIAL OPENING: ICD-10-PCS | Performed by: OBSTETRICS & GYNECOLOGY

## 2020-10-05 PROCEDURE — 74011250636 HC RX REV CODE- 250/636: Performed by: OBSTETRICS & GYNECOLOGY

## 2020-10-05 PROCEDURE — 74011000250 HC RX REV CODE- 250: Performed by: OBSTETRICS & GYNECOLOGY

## 2020-10-05 PROCEDURE — 3E033VJ INTRODUCTION OF OTHER HORMONE INTO PERIPHERAL VEIN, PERCUTANEOUS APPROACH: ICD-10-PCS | Performed by: OBSTETRICS & GYNECOLOGY

## 2020-10-05 PROCEDURE — 86920 COMPATIBILITY TEST SPIN: CPT

## 2020-10-05 PROCEDURE — 75410000002 HC LABOR FEE PER 1 HR

## 2020-10-05 PROCEDURE — 65270000029 HC RM PRIVATE

## 2020-10-05 PROCEDURE — 86900 BLOOD TYPING SEROLOGIC ABO: CPT

## 2020-10-05 PROCEDURE — 59200 INSERT CERVICAL DILATOR: CPT

## 2020-10-05 PROCEDURE — 85025 COMPLETE CBC W/AUTO DIFF WBC: CPT

## 2020-10-05 PROCEDURE — 4A1HXCZ MONITORING OF PRODUCTS OF CONCEPTION, CARDIAC RATE, EXTERNAL APPROACH: ICD-10-PCS | Performed by: OBSTETRICS & GYNECOLOGY

## 2020-10-05 RX ORDER — METHYLERGONOVINE MALEATE 0.2 MG/ML
0.2 INJECTION INTRAVENOUS AS NEEDED
Status: DISCONTINUED | OUTPATIENT
Start: 2020-10-05 | End: 2020-10-06 | Stop reason: HOSPADM

## 2020-10-05 RX ORDER — OXYTOCIN/RINGER'S LACTATE 30/500 ML
10 PLASTIC BAG, INJECTION (ML) INTRAVENOUS ONCE
Status: DISPENSED | OUTPATIENT
Start: 2020-10-05 | End: 2020-10-05

## 2020-10-05 RX ORDER — OXYTOCIN/0.9 % SODIUM CHLORIDE 30/500 ML
1-25 PLASTIC BAG, INJECTION (ML) INTRAVENOUS
Status: DISCONTINUED | OUTPATIENT
Start: 2020-10-05 | End: 2020-10-06 | Stop reason: HOSPADM

## 2020-10-05 RX ORDER — HYDROMORPHONE HYDROCHLORIDE 1 MG/ML
1 INJECTION, SOLUTION INTRAMUSCULAR; INTRAVENOUS; SUBCUTANEOUS
Status: DISCONTINUED | OUTPATIENT
Start: 2020-10-05 | End: 2020-10-06 | Stop reason: HOSPADM

## 2020-10-05 RX ORDER — BUTORPHANOL TARTRATE 2 MG/ML
2 INJECTION INTRAMUSCULAR; INTRAVENOUS
Status: DISCONTINUED | OUTPATIENT
Start: 2020-10-05 | End: 2020-10-06 | Stop reason: HOSPADM

## 2020-10-05 RX ORDER — OXYTOCIN/0.9 % SODIUM CHLORIDE 30/500 ML
95 PLASTIC BAG, INJECTION (ML) INTRAVENOUS ONCE
Status: ACTIVE | OUTPATIENT
Start: 2020-10-05 | End: 2020-10-05

## 2020-10-05 RX ORDER — MISOPROSTOL 100 UG/1
25 TABLET ORAL EVERY 4 HOURS
Status: DISPENSED | OUTPATIENT
Start: 2020-10-05 | End: 2020-10-05

## 2020-10-05 RX ORDER — SODIUM CHLORIDE, SODIUM LACTATE, POTASSIUM CHLORIDE, CALCIUM CHLORIDE 600; 310; 30; 20 MG/100ML; MG/100ML; MG/100ML; MG/100ML
125 INJECTION, SOLUTION INTRAVENOUS CONTINUOUS
Status: DISCONTINUED | OUTPATIENT
Start: 2020-10-05 | End: 2020-10-06 | Stop reason: HOSPADM

## 2020-10-05 RX ORDER — MISOPROSTOL 100 UG/1
25 TABLET ORAL EVERY 4 HOURS
Status: DISCONTINUED | OUTPATIENT
Start: 2020-10-05 | End: 2020-10-05

## 2020-10-05 RX ORDER — NALBUPHINE HYDROCHLORIDE 10 MG/ML
10 INJECTION, SOLUTION INTRAMUSCULAR; INTRAVENOUS; SUBCUTANEOUS
Status: DISCONTINUED | OUTPATIENT
Start: 2020-10-05 | End: 2020-10-06 | Stop reason: HOSPADM

## 2020-10-05 RX ORDER — TERBUTALINE SULFATE 1 MG/ML
0.25 INJECTION SUBCUTANEOUS
Status: DISCONTINUED | OUTPATIENT
Start: 2020-10-05 | End: 2020-10-06 | Stop reason: HOSPADM

## 2020-10-05 RX ORDER — LIDOCAINE HYDROCHLORIDE 10 MG/ML
20 INJECTION, SOLUTION EPIDURAL; INFILTRATION; INTRACAUDAL; PERINEURAL AS NEEDED
Status: DISCONTINUED | OUTPATIENT
Start: 2020-10-05 | End: 2020-10-06 | Stop reason: HOSPADM

## 2020-10-05 RX ORDER — ONDANSETRON 2 MG/ML
4 INJECTION INTRAMUSCULAR; INTRAVENOUS
Status: DISCONTINUED | OUTPATIENT
Start: 2020-10-05 | End: 2020-10-06 | Stop reason: HOSPADM

## 2020-10-05 RX ORDER — MINERAL OIL
30 OIL (ML) ORAL AS NEEDED
Status: DISCONTINUED | OUTPATIENT
Start: 2020-10-05 | End: 2020-10-06 | Stop reason: HOSPADM

## 2020-10-05 RX ADMIN — SODIUM CHLORIDE, SODIUM LACTATE, POTASSIUM CHLORIDE, AND CALCIUM CHLORIDE 125 ML/HR: 600; 310; 30; 20 INJECTION, SOLUTION INTRAVENOUS at 08:12

## 2020-10-05 RX ADMIN — MISOPROSTOL 25 MCG: 100 TABLET ORAL at 14:15

## 2020-10-05 RX ADMIN — MISOPROSTOL 25 MCG: 100 TABLET ORAL at 10:15

## 2020-10-05 RX ADMIN — BUTORPHANOL TARTRATE 2 MG: 2 INJECTION, SOLUTION INTRAMUSCULAR; INTRAVENOUS at 20:54

## 2020-10-05 RX ADMIN — SODIUM CHLORIDE, SODIUM LACTATE, POTASSIUM CHLORIDE, AND CALCIUM CHLORIDE 125 ML/HR: 600; 310; 30; 20 INJECTION, SOLUTION INTRAVENOUS at 15:39

## 2020-10-05 NOTE — H&P
History & Physical    Name: Cezar Norton MRN: [id number]  SSN: [id number]    YOB: 1995  Age: 31 y.o. Sex: female      Subjective:     Estimated Date of Delivery: 10/22/20  OB History    Para Term  AB Living   8 7 5 2   6   SAB TAB Ectopic Molar Multiple Live Births           0 7      # Outcome Date GA Lbr El/2nd Weight Sex Delivery Anes PTL Lv   8 Current            7 Term 19 39w1d 03:15 / 00:45 6 lb (2.722 kg) F Vag-Spont None N EDWIN   6 Term 2017   7 lb 3 oz (3.26 kg) M Vag-Spont   EDWIN      Birth Comments: [employer]   5 Term 16 40w2d 07:48 / 00:03 6 lb 1 oz (2.75 kg) M VAGINAL DELI EPIDURAL AN N EDWIN   4 Term 14 40w2d 27:30 / 00:31 7 lb 0.6 oz (3.193 kg) M VAGINAL DELI Local N EDWIN      Birth Comments: Ana'den   3  13 36w0d  5 lb 12 oz (2.608 kg) M Vag-Spont EPI  DEC      Birth Comments: SIDS   2  12 35w0d  5 lb 2 oz (2.325 kg) M Vag-Spont EPI  EDWIN      Birth Comments: Debra Duarte   1 Term 11 40w0d  6 lb 2 oz (2.778 kg) M Vag-Spont EPI  EDWIN      Birth Comments: Hernán Newberry       Ms. Roger Broussard is admitted with pregnancy at 37w4d for induction of labor due to poor fetal growth. Prenatal course was complicated by fetal growth restriction, positive UDS for cannabinoids, history of asthma, varicella non immune, sickle trait, grand multiparity. Most recent EFW was 4 lb. 14 oz on 10/2/20. EFW 3%, AC 0%. UDS on 20 was negative. Please see prenatal records for details. Past Medical History:   Diagnosis Date    Abnormal Papanicolaou smear of cervix     Anemia     Asthma     Frequent UTI     Gestational diabetes     History of chlamydia     HX OTHER MEDICAL     [place] Palsy in last pregnancy    Postpartum depression     severe    Psychiatric problem     depression, anxiety, and PTSD    Pyelonephritis affecting pregnancy in second trimester 2019    Sickle cell trait syndrome ([address]      No past surgical history on file.   Social History     Occupational History    Not on file   Tobacco Use    Smoking status: Current Every Day Smoker     Packs/day: 0.25    Smokeless tobacco: Never Used   Substance and Sexual Activity    Alcohol use: No    Drug use: Yes     Types: Marijuana    Sexual activity: Not Currently     Partners: Male     Family History   Problem Relation Age of Onset    Diabetes Maternal Grandmother     Hypertension Maternal Grandmother     Diabetes Paternal Grandmother     Heart Disease Paternal Grandmother     Hypertension Paternal Grandmother        Allergies   Allergen Reactions    Banana Itching    Coconut Itching    Iodine Swelling     Pt states she has had betadine in the past and can have that    Kiwi Itching    [place] Swelling    Shellfish Derived Swelling    Watermelon Swelling     Prior to Admission medications    Medication Sig Start Date End Date Taking? Authorizing Provider   polyethylene glycol (Miralax) 17 gram/dose powder Take 17 g by mouth daily. Yes Provider, Historical   docusate sodium (Colace) 100 mg capsule Take 100 mg by mouth two (2) times a day. Yes Provider, Historical   ferrous sulfate (IRON) 325 mg (65 mg iron) EC tablet Take 1 Tab by mouth every other day. 8/15/20  Yes Carlyle Butterfield NP   PNV No12-Iron-FA-DSS-OM-3 29 mg iron-1 mg -50 mg CPKD Take  by mouth. Yes Provider, Historical   albuterol (PROVENTIL) 2 mg tablet Take 2 mg by mouth three (3) times daily. Provider, Historical        Review of Systems: Pertinent items are noted in the History of Present Illness. Objective:     Vitals:  Vitals:    10/05/20 0801 10/05/20 0837   BP: 101/74    Pulse: (!) 102    Resp: 16    Temp: 98.4 °F (36.9 °C)    Weight:  116 lb (52.6 kg)   Height:  5' 2\" (1.575 m)        Physical Exam:  Patient without distress. Abdomen: soft, non tender, EFW 4 lbs.   Cervical Exam: 1 cm dilated/50/-2   Membranes:  Intact  Fetal Heart Rate: Reactive          Prenatal Labs:   Lab Results   Component Value Date/Time    ABO/Rh(D) O POSITIVE 04/03/2019 03:40 AM    Rubella, External Immune 08/22/2020    HBsAg, External Negative 08/22/2020    HIV, External Non Reactive 08/22/2020    RPR, External Non Reavtive 08/22/2020    Gonorrhea, External negative 06/24/2014    Chlamydia, External negative 06/24/2014    ABO,Rh O Positive 01/08/2019    GrBStrep, External Negative 09/22/2020       Impression/Plan:     Principal Problem:    IUGR (intrauterine growth restriction) affecting care of mother (10/5/2020)    Active Problems:    Marijuana use (5/27/2020)      Overview: UDS is positive for cannabinoids in 2nd trimester. Repeat UDS in 3rd       trimester. UDS negative 9/18/2020      Asthma during pregnancy (5/27/2020)      Overview: No medications x6 years      Anemia in pregnancy, third trimester (8/14/2020)      Overview: Hgb 8/13/2020: 8.5.       8/20/20 8.6      Ferrous sulfate started. 9/18/2020: 7.2 (POC)- ordered iron injections. Susceptible to varicella (non-immune), currently pregnant (8/22/2020)      Overview: Vaccinate postpartum         Plan: Admit for induction of labor. Group B Strep negative. Will place cytotec for ripening.

## 2020-10-05 NOTE — PROGRESS NOTES
Labor Progress Note  Patient seen, fetal heart rate and contraction pattern evaluated, patient examined. No data found. Physical Exam:  Cervical Exam:  1-2 cm dilated/50/-1-2  Membranes:  Intact  Uterine Activity: None  Fetal Heart Rate: Reactive    Assessment/Plan:  Reassuring fetal status, Continue plan for vaginal delivery. Cleola Hinders balloon placed with 80 cc's of fluid in each balloon.

## 2020-10-05 NOTE — PROGRESS NOTES
Problem: Patient Education: Go to Patient Education Activity  Goal: Patient/Family Education  Outcome: Progressing Towards Goal     Problem: Vaginal Delivery: Day of Deliver-Laboring  Goal: Activity/Safety  Outcome: Progressing Towards Goal  Goal: Consults, if ordered  Outcome: Progressing Towards Goal  Goal: Diagnostic Test/Procedures  Outcome: Progressing Towards Goal  Goal: Nutrition/Diet  Outcome: Progressing Towards Goal  Goal: Discharge Planning  Outcome: Progressing Towards Goal  Goal: Medications  Outcome: Progressing Towards Goal  Goal: Respiratory  Outcome: Progressing Towards Goal  Goal: Treatments/Interventions/Procedures  Outcome: Progressing Towards Goal  Goal: *Vital signs within defined limits  Outcome: Progressing Towards Goal  Goal: *Labs within defined limits  Outcome: Progressing Towards Goal  Goal: *Hemodynamically stable  Outcome: Progressing Towards Goal  Goal: *Optimal pain control at patient's stated goal  Outcome: Progressing Towards Goal     Problem: Vaginal Delivery: Day of Deliver-Laboring  Goal: Activity/Safety  Outcome: Progressing Towards Goal  Goal: Consults, if ordered  Outcome: Progressing Towards Goal  Goal: Diagnostic Test/Procedures  Outcome: Progressing Towards Goal  Goal: Nutrition/Diet  Outcome: Progressing Towards Goal  Goal: Discharge Planning  Outcome: Progressing Towards Goal  Goal: Medications  Outcome: Progressing Towards Goal  Goal: Respiratory  Outcome: Progressing Towards Goal  Goal: Treatments/Interventions/Procedures  Outcome: Progressing Towards Goal  Goal: *Vital signs within defined limits  Outcome: Progressing Towards Goal  Goal: *Labs within defined limits  Outcome: Progressing Towards Goal  Goal: *Hemodynamically stable  Outcome: Progressing Towards Goal  Goal: *Optimal pain control at patient's stated goal  Outcome: Progressing Towards Goal

## 2020-10-05 NOTE — PROGRESS NOTES
0751  Pt arrived to L&D unit, ambulated to L&D room 6, changed into gown, used restroom. Oriented to room and call bell. 65  Consents reviewed and signed with pt, pt verbalizes understanding. 1326  Dr. German White at bedside for SVE and to discuss POC, pt verbalizes understanding. 101 Ligia Gary called and notified that there is no Cytotec in [facility]. Waiting for pharmacy to bring more up to L&D.     0947  Pastoral care called and informed that pt would like an Advanced Directive drawn up. 1930  Bedside shift change report given to YOGI Murphy, RN (oncoming nurse) by Maria Ines Turpin RN (offgoing nurse). Report included the following information SBAR, Kardex, Intake/Output, MAR, Recent Results and Quality Measures.

## 2020-10-06 ENCOUNTER — ANESTHESIA (OUTPATIENT)
Dept: LABOR AND DELIVERY | Age: 25
DRG: 540 | End: 2020-10-06
Payer: MEDICAID

## 2020-10-06 ENCOUNTER — ANESTHESIA EVENT (OUTPATIENT)
Dept: LABOR AND DELIVERY | Age: 25
DRG: 540 | End: 2020-10-06
Payer: MEDICAID

## 2020-10-06 PROBLEM — O32.6XX0: Status: ACTIVE | Noted: 2020-10-06

## 2020-10-06 PROBLEM — Z98.891 S/P CESAREAN SECTION: Status: ACTIVE | Noted: 2020-10-06

## 2020-10-06 LAB
DAILY QC (YES/NO)?: YES
PH, VAGINAL FLUID: NEGATIVE (ref 5–6.1)

## 2020-10-06 PROCEDURE — 74011250637 HC RX REV CODE- 250/637: Performed by: OBSTETRICS & GYNECOLOGY

## 2020-10-06 PROCEDURE — 65270000029 HC RM PRIVATE

## 2020-10-06 PROCEDURE — 74011000250 HC RX REV CODE- 250: Performed by: NURSE ANESTHETIST, CERTIFIED REGISTERED

## 2020-10-06 PROCEDURE — 83986 ASSAY PH BODY FLUID NOS: CPT | Performed by: OBSTETRICS & GYNECOLOGY

## 2020-10-06 PROCEDURE — 77030040361 HC SLV COMPR DVT MDII -B: Performed by: OBSTETRICS & GYNECOLOGY

## 2020-10-06 PROCEDURE — 75410000002 HC LABOR FEE PER 1 HR

## 2020-10-06 PROCEDURE — 2709999900 HC NON-CHARGEABLE SUPPLY: Performed by: OBSTETRICS & GYNECOLOGY

## 2020-10-06 PROCEDURE — 75410000003 HC RECOV DEL/VAG/CSECN EA 0.5 HR: Performed by: OBSTETRICS & GYNECOLOGY

## 2020-10-06 PROCEDURE — 77030018809 HC RETRCTR ALXSO DISP AMR -B: Performed by: OBSTETRICS & GYNECOLOGY

## 2020-10-06 PROCEDURE — 88307 TISSUE EXAM BY PATHOLOGIST: CPT

## 2020-10-06 PROCEDURE — 77030002933 HC SUT MCRYL J&J -A: Performed by: OBSTETRICS & GYNECOLOGY

## 2020-10-06 PROCEDURE — 74011000250 HC RX REV CODE- 250: Performed by: ANESTHESIOLOGY

## 2020-10-06 PROCEDURE — 77030008477 HC STYL SATN SLP COVD -A: Performed by: ANESTHESIOLOGY

## 2020-10-06 PROCEDURE — 77030011265 HC ELECTRD BLD HEX COVD -A: Performed by: OBSTETRICS & GYNECOLOGY

## 2020-10-06 PROCEDURE — 74011250636 HC RX REV CODE- 250/636: Performed by: OBSTETRICS & GYNECOLOGY

## 2020-10-06 PROCEDURE — 77030027138 HC INCENT SPIROMETER -A

## 2020-10-06 PROCEDURE — 77030031139 HC SUT VCRL2 J&J -A: Performed by: OBSTETRICS & GYNECOLOGY

## 2020-10-06 PROCEDURE — 77030008459 HC STPLR SKN COOP -B: Performed by: OBSTETRICS & GYNECOLOGY

## 2020-10-06 PROCEDURE — 74011250636 HC RX REV CODE- 250/636: Performed by: ANESTHESIOLOGY

## 2020-10-06 PROCEDURE — 77030006643: Performed by: ANESTHESIOLOGY

## 2020-10-06 PROCEDURE — 76060000078 HC EPIDURAL ANESTHESIA

## 2020-10-06 PROCEDURE — 77030008683 HC TU ET CUF COVD -A: Performed by: ANESTHESIOLOGY

## 2020-10-06 PROCEDURE — 76060000032 HC ANESTHESIA 0.5 TO 1 HR: Performed by: OBSTETRICS & GYNECOLOGY

## 2020-10-06 PROCEDURE — 76010000391 HC C SECN FIRST 1 HR: Performed by: OBSTETRICS & GYNECOLOGY

## 2020-10-06 PROCEDURE — 77030010507 HC ADH SKN DERMBND J&J -B: Performed by: OBSTETRICS & GYNECOLOGY

## 2020-10-06 PROCEDURE — 74011000250 HC RX REV CODE- 250

## 2020-10-06 PROCEDURE — 74011250636 HC RX REV CODE- 250/636: Performed by: NURSE ANESTHETIST, CERTIFIED REGISTERED

## 2020-10-06 PROCEDURE — 75410000003 HC RECOV DEL/VAG/CSECN EA 0.5 HR

## 2020-10-06 PROCEDURE — 77010026065 HC OXYGEN MINIMUM MEDICAL AIR

## 2020-10-06 RX ORDER — ONDANSETRON 2 MG/ML
INJECTION INTRAMUSCULAR; INTRAVENOUS AS NEEDED
Status: DISCONTINUED | OUTPATIENT
Start: 2020-10-06 | End: 2020-10-06 | Stop reason: HOSPADM

## 2020-10-06 RX ORDER — SIMETHICONE 80 MG
80 TABLET,CHEWABLE ORAL
Status: DISCONTINUED | OUTPATIENT
Start: 2020-10-06 | End: 2020-10-06 | Stop reason: SDUPTHER

## 2020-10-06 RX ORDER — SODIUM CHLORIDE, SODIUM LACTATE, POTASSIUM CHLORIDE, CALCIUM CHLORIDE 600; 310; 30; 20 MG/100ML; MG/100ML; MG/100ML; MG/100ML
25 INJECTION, SOLUTION INTRAVENOUS CONTINUOUS
Status: DISCONTINUED | OUTPATIENT
Start: 2020-10-06 | End: 2020-10-06 | Stop reason: HOSPADM

## 2020-10-06 RX ORDER — FACIAL-BODY WIPES
10 EACH TOPICAL
Status: DISCONTINUED | OUTPATIENT
Start: 2020-10-06 | End: 2020-10-09 | Stop reason: HOSPADM

## 2020-10-06 RX ORDER — PROPOFOL 10 MG/ML
INJECTION, EMULSION INTRAVENOUS AS NEEDED
Status: DISCONTINUED | OUTPATIENT
Start: 2020-10-06 | End: 2020-10-06 | Stop reason: HOSPADM

## 2020-10-06 RX ORDER — EPHEDRINE SULFATE/0.9% NACL/PF 50 MG/5 ML
SYRINGE (ML) INTRAVENOUS AS NEEDED
Status: DISCONTINUED | OUTPATIENT
Start: 2020-10-06 | End: 2020-10-06 | Stop reason: HOSPADM

## 2020-10-06 RX ORDER — SODIUM CHLORIDE, SODIUM LACTATE, POTASSIUM CHLORIDE, CALCIUM CHLORIDE 600; 310; 30; 20 MG/100ML; MG/100ML; MG/100ML; MG/100ML
125 INJECTION, SOLUTION INTRAVENOUS CONTINUOUS
Status: DISCONTINUED | OUTPATIENT
Start: 2020-10-06 | End: 2020-10-06 | Stop reason: HOSPADM

## 2020-10-06 RX ORDER — HYDROMORPHONE HYDROCHLORIDE 2 MG/ML
0.5 INJECTION, SOLUTION INTRAMUSCULAR; INTRAVENOUS; SUBCUTANEOUS
Status: DISCONTINUED | OUTPATIENT
Start: 2020-10-06 | End: 2020-10-06 | Stop reason: HOSPADM

## 2020-10-06 RX ORDER — KETOROLAC TROMETHAMINE 30 MG/ML
30 INJECTION, SOLUTION INTRAMUSCULAR; INTRAVENOUS EVERY 6 HOURS
Status: DISPENSED | OUTPATIENT
Start: 2020-10-06 | End: 2020-10-08

## 2020-10-06 RX ORDER — OXYTOCIN/RINGER'S LACTATE 30/500 ML
10 PLASTIC BAG, INJECTION (ML) INTRAVENOUS ONCE
Status: DISCONTINUED | OUTPATIENT
Start: 2020-10-06 | End: 2020-10-06 | Stop reason: SDUPTHER

## 2020-10-06 RX ORDER — SODIUM CHLORIDE 0.9 % (FLUSH) 0.9 %
5-40 SYRINGE (ML) INJECTION EVERY 8 HOURS
Status: DISCONTINUED | OUTPATIENT
Start: 2020-10-06 | End: 2020-10-06 | Stop reason: SDUPTHER

## 2020-10-06 RX ORDER — CEFAZOLIN SODIUM/WATER 2 G/20 ML
2 SYRINGE (ML) INTRAVENOUS ONCE
Status: COMPLETED | OUTPATIENT
Start: 2020-10-06 | End: 2020-10-06

## 2020-10-06 RX ORDER — ACETAMINOPHEN 500 MG
1000 TABLET ORAL
Status: DISCONTINUED | OUTPATIENT
Start: 2020-10-06 | End: 2020-10-06 | Stop reason: SDUPTHER

## 2020-10-06 RX ORDER — LIDOCAINE HYDROCHLORIDE AND EPINEPHRINE 15; 5 MG/ML; UG/ML
INJECTION, SOLUTION EPIDURAL
Status: SHIPPED | OUTPATIENT
Start: 2020-10-06 | End: 2020-10-06

## 2020-10-06 RX ORDER — SUCCINYLCHOLINE CHLORIDE 100 MG/5ML
SYRINGE (ML) INTRAVENOUS AS NEEDED
Status: DISCONTINUED | OUTPATIENT
Start: 2020-10-06 | End: 2020-10-06 | Stop reason: HOSPADM

## 2020-10-06 RX ORDER — FENTANYL/ROPIVACAINE/NS/PF 2MCG/ML-.1
PLASTIC BAG, INJECTION (ML) EPIDURAL
Status: COMPLETED
Start: 2020-10-06 | End: 2020-10-06

## 2020-10-06 RX ORDER — OXYTOCIN/0.9 % SODIUM CHLORIDE 30/500 ML
PLASTIC BAG, INJECTION (ML) INTRAVENOUS
Status: DISPENSED
Start: 2020-10-06 | End: 2020-10-07

## 2020-10-06 RX ORDER — IBUPROFEN 400 MG/1
800 TABLET ORAL
Status: DISCONTINUED | OUTPATIENT
Start: 2020-10-09 | End: 2020-10-06 | Stop reason: SDUPTHER

## 2020-10-06 RX ORDER — ALBUTEROL SULFATE 0.83 MG/ML
2.5 SOLUTION RESPIRATORY (INHALATION)
Status: COMPLETED | OUTPATIENT
Start: 2020-10-06 | End: 2020-10-06

## 2020-10-06 RX ORDER — HYDROMORPHONE HCL IN WATER/PF 1 MG/ML
SYRINGE (ML) INJECTION CONTINUOUS
Status: DISCONTINUED | OUTPATIENT
Start: 2020-10-06 | End: 2020-10-09 | Stop reason: HOSPADM

## 2020-10-06 RX ORDER — LIDOCAINE HYDROCHLORIDE 20 MG/ML
INJECTION, SOLUTION EPIDURAL; INFILTRATION; INTRACAUDAL; PERINEURAL AS NEEDED
Status: DISCONTINUED | OUTPATIENT
Start: 2020-10-06 | End: 2020-10-06 | Stop reason: HOSPADM

## 2020-10-06 RX ORDER — OXYTOCIN/RINGER'S LACTATE 20/1000 ML
PLASTIC BAG, INJECTION (ML) INTRAVENOUS
Status: DISCONTINUED | OUTPATIENT
Start: 2020-10-06 | End: 2020-10-06 | Stop reason: HOSPADM

## 2020-10-06 RX ORDER — KETOROLAC TROMETHAMINE 30 MG/ML
30 INJECTION, SOLUTION INTRAMUSCULAR; INTRAVENOUS EVERY 6 HOURS
Status: DISCONTINUED | OUTPATIENT
Start: 2020-10-06 | End: 2020-10-06

## 2020-10-06 RX ORDER — ACETAMINOPHEN 325 MG/1
650 TABLET ORAL
Status: DISCONTINUED | OUTPATIENT
Start: 2020-10-06 | End: 2020-10-09 | Stop reason: HOSPADM

## 2020-10-06 RX ORDER — FLUMAZENIL 0.1 MG/ML
0.2 INJECTION INTRAVENOUS
Status: DISCONTINUED | OUTPATIENT
Start: 2020-10-06 | End: 2020-10-06 | Stop reason: HOSPADM

## 2020-10-06 RX ORDER — DIPHENHYDRAMINE HYDROCHLORIDE 50 MG/ML
12.5 INJECTION, SOLUTION INTRAMUSCULAR; INTRAVENOUS
Status: DISCONTINUED | OUTPATIENT
Start: 2020-10-06 | End: 2020-10-06 | Stop reason: HOSPADM

## 2020-10-06 RX ORDER — SODIUM CHLORIDE 0.9 % (FLUSH) 0.9 %
5-40 SYRINGE (ML) INJECTION EVERY 8 HOURS
Status: DISCONTINUED | OUTPATIENT
Start: 2020-10-06 | End: 2020-10-06 | Stop reason: HOSPADM

## 2020-10-06 RX ORDER — SENNOSIDES 8.6 MG/1
1 TABLET ORAL 2 TIMES DAILY
Status: DISCONTINUED | OUTPATIENT
Start: 2020-10-06 | End: 2020-10-09 | Stop reason: HOSPADM

## 2020-10-06 RX ORDER — IBUPROFEN 400 MG/1
800 TABLET ORAL
Status: DISCONTINUED | OUTPATIENT
Start: 2020-10-09 | End: 2020-10-08

## 2020-10-06 RX ORDER — ONDANSETRON 2 MG/ML
4 INJECTION INTRAMUSCULAR; INTRAVENOUS
Status: DISCONTINUED | OUTPATIENT
Start: 2020-10-06 | End: 2020-10-09 | Stop reason: HOSPADM

## 2020-10-06 RX ORDER — PHENYLEPHRINE HCL IN 0.9% NACL 1 MG/10 ML
80 SYRINGE (ML) INTRAVENOUS AS NEEDED
Status: DISCONTINUED | OUTPATIENT
Start: 2020-10-06 | End: 2020-10-06 | Stop reason: HOSPADM

## 2020-10-06 RX ORDER — FENTANYL/ROPIVACAINE/NS/PF 2MCG/ML-.1
1-15 PLASTIC BAG, INJECTION (ML) EPIDURAL
Status: DISCONTINUED | OUTPATIENT
Start: 2020-10-06 | End: 2020-10-06 | Stop reason: HOSPADM

## 2020-10-06 RX ORDER — SODIUM CHLORIDE 0.9 % (FLUSH) 0.9 %
5-40 SYRINGE (ML) INJECTION EVERY 8 HOURS
Status: DISCONTINUED | OUTPATIENT
Start: 2020-10-06 | End: 2020-10-09 | Stop reason: ALTCHOICE

## 2020-10-06 RX ORDER — OXYTOCIN/0.9 % SODIUM CHLORIDE 30/500 ML
95 PLASTIC BAG, INJECTION (ML) INTRAVENOUS ONCE
Status: DISCONTINUED | OUTPATIENT
Start: 2020-10-06 | End: 2020-10-06 | Stop reason: SDUPTHER

## 2020-10-06 RX ORDER — SODIUM CHLORIDE, SODIUM LACTATE, POTASSIUM CHLORIDE, CALCIUM CHLORIDE 600; 310; 30; 20 MG/100ML; MG/100ML; MG/100ML; MG/100ML
125 INJECTION, SOLUTION INTRAVENOUS CONTINUOUS
Status: DISPENSED | OUTPATIENT
Start: 2020-10-06 | End: 2020-10-07

## 2020-10-06 RX ORDER — HYDROMORPHONE HYDROCHLORIDE 2 MG/ML
INJECTION, SOLUTION INTRAMUSCULAR; INTRAVENOUS; SUBCUTANEOUS AS NEEDED
Status: DISCONTINUED | OUTPATIENT
Start: 2020-10-06 | End: 2020-10-06 | Stop reason: HOSPADM

## 2020-10-06 RX ORDER — SODIUM CHLORIDE 0.9 % (FLUSH) 0.9 %
5-40 SYRINGE (ML) INJECTION AS NEEDED
Status: DISCONTINUED | OUTPATIENT
Start: 2020-10-06 | End: 2020-10-06 | Stop reason: SDUPTHER

## 2020-10-06 RX ORDER — ACETAMINOPHEN 325 MG/1
650 TABLET ORAL
Status: DISCONTINUED | OUTPATIENT
Start: 2020-10-06 | End: 2020-10-06 | Stop reason: SDUPTHER

## 2020-10-06 RX ORDER — PROMETHAZINE HYDROCHLORIDE 25 MG/ML
25 INJECTION, SOLUTION INTRAMUSCULAR; INTRAVENOUS
Status: DISCONTINUED | OUTPATIENT
Start: 2020-10-06 | End: 2020-10-09 | Stop reason: HOSPADM

## 2020-10-06 RX ORDER — ONDANSETRON 2 MG/ML
4 INJECTION INTRAMUSCULAR; INTRAVENOUS ONCE
Status: DISCONTINUED | OUTPATIENT
Start: 2020-10-06 | End: 2020-10-06 | Stop reason: HOSPADM

## 2020-10-06 RX ORDER — OXYTOCIN/0.9 % SODIUM CHLORIDE 30/500 ML
95 PLASTIC BAG, INJECTION (ML) INTRAVENOUS ONCE
Status: ACTIVE | OUTPATIENT
Start: 2020-10-06 | End: 2020-10-07

## 2020-10-06 RX ORDER — SODIUM CHLORIDE, SODIUM LACTATE, POTASSIUM CHLORIDE, CALCIUM CHLORIDE 600; 310; 30; 20 MG/100ML; MG/100ML; MG/100ML; MG/100ML
125 INJECTION, SOLUTION INTRAVENOUS CONTINUOUS
Status: DISCONTINUED | OUTPATIENT
Start: 2020-10-06 | End: 2020-10-06 | Stop reason: SDUPTHER

## 2020-10-06 RX ORDER — CEFAZOLIN SODIUM/WATER 2 G/20 ML
SYRINGE (ML) INTRAVENOUS
Status: COMPLETED
Start: 2020-10-06 | End: 2020-10-06

## 2020-10-06 RX ORDER — ZOLPIDEM TARTRATE 5 MG/1
5 TABLET ORAL
Status: DISCONTINUED | OUTPATIENT
Start: 2020-10-06 | End: 2020-10-06 | Stop reason: SDUPTHER

## 2020-10-06 RX ORDER — EPHEDRINE SULFATE/0.9% NACL/PF 50 MG/5 ML
10 SYRINGE (ML) INTRAVENOUS AS NEEDED
Status: DISCONTINUED | OUTPATIENT
Start: 2020-10-06 | End: 2020-10-06 | Stop reason: HOSPADM

## 2020-10-06 RX ORDER — OXYTOCIN/RINGER'S LACTATE 30/500 ML
10 PLASTIC BAG, INJECTION (ML) INTRAVENOUS ONCE
Status: DISPENSED | OUTPATIENT
Start: 2020-10-06 | End: 2020-10-07

## 2020-10-06 RX ORDER — OXYCODONE AND ACETAMINOPHEN 5; 325 MG/1; MG/1
1-2 TABLET ORAL
Status: DISCONTINUED | OUTPATIENT
Start: 2020-10-06 | End: 2020-10-09 | Stop reason: HOSPADM

## 2020-10-06 RX ORDER — SIMETHICONE 80 MG
80 TABLET,CHEWABLE ORAL
Status: DISCONTINUED | OUTPATIENT
Start: 2020-10-06 | End: 2020-10-09 | Stop reason: HOSPADM

## 2020-10-06 RX ORDER — DIPHENHYDRAMINE HYDROCHLORIDE 50 MG/ML
12.5 INJECTION, SOLUTION INTRAMUSCULAR; INTRAVENOUS
Status: DISCONTINUED | OUTPATIENT
Start: 2020-10-06 | End: 2020-10-09 | Stop reason: HOSPADM

## 2020-10-06 RX ORDER — FACIAL-BODY WIPES
10 EACH TOPICAL
Status: DISCONTINUED | OUTPATIENT
Start: 2020-10-06 | End: 2020-10-06 | Stop reason: SDUPTHER

## 2020-10-06 RX ORDER — LIDOCAINE HYDROCHLORIDE AND EPINEPHRINE 20; 5 MG/ML; UG/ML
INJECTION, SOLUTION EPIDURAL; INFILTRATION; INTRACAUDAL; PERINEURAL AS NEEDED
Status: DISCONTINUED | OUTPATIENT
Start: 2020-10-06 | End: 2020-10-06 | Stop reason: HOSPADM

## 2020-10-06 RX ORDER — PHENYLEPHRINE HCL IN 0.9% NACL 1 MG/10 ML
SYRINGE (ML) INTRAVENOUS AS NEEDED
Status: DISCONTINUED | OUTPATIENT
Start: 2020-10-06 | End: 2020-10-06

## 2020-10-06 RX ORDER — SODIUM CHLORIDE 0.9 % (FLUSH) 0.9 %
5-40 SYRINGE (ML) INJECTION AS NEEDED
Status: DISCONTINUED | OUTPATIENT
Start: 2020-10-06 | End: 2020-10-06 | Stop reason: HOSPADM

## 2020-10-06 RX ORDER — PROMETHAZINE HYDROCHLORIDE 25 MG/ML
25 INJECTION, SOLUTION INTRAMUSCULAR; INTRAVENOUS
Status: DISCONTINUED | OUTPATIENT
Start: 2020-10-06 | End: 2020-10-06 | Stop reason: SDUPTHER

## 2020-10-06 RX ORDER — HYDROMORPHONE HYDROCHLORIDE 2 MG/ML
0.2 INJECTION, SOLUTION INTRAMUSCULAR; INTRAVENOUS; SUBCUTANEOUS AS NEEDED
Status: DISCONTINUED | OUTPATIENT
Start: 2020-10-06 | End: 2020-10-06 | Stop reason: HOSPADM

## 2020-10-06 RX ORDER — LIDOCAINE HYDROCHLORIDE 20 MG/ML
INJECTION, SOLUTION EPIDURAL; INFILTRATION; INTRACAUDAL; PERINEURAL AS NEEDED
Status: DISCONTINUED | OUTPATIENT
Start: 2020-10-06 | End: 2020-10-06

## 2020-10-06 RX ORDER — OXYCODONE AND ACETAMINOPHEN 5; 325 MG/1; MG/1
1-2 TABLET ORAL
Status: DISCONTINUED | OUTPATIENT
Start: 2020-10-06 | End: 2020-10-06 | Stop reason: SDUPTHER

## 2020-10-06 RX ORDER — SODIUM CHLORIDE 0.9 % (FLUSH) 0.9 %
5-40 SYRINGE (ML) INJECTION AS NEEDED
Status: DISCONTINUED | OUTPATIENT
Start: 2020-10-06 | End: 2020-10-09 | Stop reason: HOSPADM

## 2020-10-06 RX ORDER — ZOLPIDEM TARTRATE 5 MG/1
5 TABLET ORAL
Status: DISCONTINUED | OUTPATIENT
Start: 2020-10-06 | End: 2020-10-09 | Stop reason: HOSPADM

## 2020-10-06 RX ADMIN — HYDROMORPHONE HYDROCHLORIDE 0.5 MG: 2 INJECTION, SOLUTION INTRAMUSCULAR; INTRAVENOUS; SUBCUTANEOUS at 14:37

## 2020-10-06 RX ADMIN — ONDANSETRON HYDROCHLORIDE 4 MG: 2 INJECTION INTRAMUSCULAR; INTRAVENOUS at 14:34

## 2020-10-06 RX ADMIN — Medication 999 ML/HR: at 14:25

## 2020-10-06 RX ADMIN — LIDOCAINE HYDROCHLORIDE,EPINEPHRINE BITARTRATE 5 ML: 20; .005 INJECTION, SOLUTION EPIDURAL; INFILTRATION; INTRACAUDAL; PERINEURAL at 14:08

## 2020-10-06 RX ADMIN — LIDOCAINE HYDROCHLORIDE,EPINEPHRINE BITARTRATE 5 ML: 15; .005 INJECTION, SOLUTION EPIDURAL; INFILTRATION; INTRACAUDAL; PERINEURAL at 11:49

## 2020-10-06 RX ADMIN — LIDOCAINE HYDROCHLORIDE,EPINEPHRINE BITARTRATE 5 ML: 20; .005 INJECTION, SOLUTION EPIDURAL; INFILTRATION; INTRACAUDAL; PERINEURAL at 14:05

## 2020-10-06 RX ADMIN — LIDOCAINE HYDROCHLORIDE,EPINEPHRINE BITARTRATE 5 ML: 20; .005 INJECTION, SOLUTION EPIDURAL; INFILTRATION; INTRACAUDAL; PERINEURAL at 14:13

## 2020-10-06 RX ADMIN — KETOROLAC TROMETHAMINE 30 MG: 30 INJECTION, SOLUTION INTRAMUSCULAR at 18:01

## 2020-10-06 RX ADMIN — SODIUM CHLORIDE, SODIUM LACTATE, POTASSIUM CHLORIDE, AND CALCIUM CHLORIDE 125 ML/HR: 600; 310; 30; 20 INJECTION, SOLUTION INTRAVENOUS at 08:42

## 2020-10-06 RX ADMIN — Medication 8 ML/HR: at 11:52

## 2020-10-06 RX ADMIN — Medication 2 G: at 14:15

## 2020-10-06 RX ADMIN — SODIUM CHLORIDE, SODIUM LACTATE, POTASSIUM CHLORIDE, AND CALCIUM CHLORIDE: 600; 310; 30; 20 INJECTION, SOLUTION INTRAVENOUS at 14:12

## 2020-10-06 RX ADMIN — LIDOCAINE HYDROCHLORIDE,EPINEPHRINE BITARTRATE 5 ML: 20; .005 INJECTION, SOLUTION EPIDURAL; INFILTRATION; INTRACAUDAL; PERINEURAL at 14:17

## 2020-10-06 RX ADMIN — ACETAMINOPHEN 1000 MG: 500 TABLET ORAL at 08:00

## 2020-10-06 RX ADMIN — LIDOCAINE HYDROCHLORIDE 5 ML: 20 INJECTION, SOLUTION EPIDURAL; INFILTRATION; INTRACAUDAL; PERINEURAL at 13:10

## 2020-10-06 RX ADMIN — SODIUM CHLORIDE, SODIUM LACTATE, POTASSIUM CHLORIDE, AND CALCIUM CHLORIDE 1000 ML: 600; 310; 30; 20 INJECTION, SOLUTION INTRAVENOUS at 10:55

## 2020-10-06 RX ADMIN — SENNOSIDES 8.6 MG: 8.6 TABLET, FILM COATED ORAL at 21:14

## 2020-10-06 RX ADMIN — SODIUM CHLORIDE, SODIUM LACTATE, POTASSIUM CHLORIDE, AND CALCIUM CHLORIDE 125 ML/HR: 600; 310; 30; 20 INJECTION, SOLUTION INTRAVENOUS at 11:55

## 2020-10-06 RX ADMIN — HYDROMORPHONE HYDROCHLORIDE 0.5 MG: 2 INJECTION, SOLUTION INTRAMUSCULAR; INTRAVENOUS; SUBCUTANEOUS at 15:31

## 2020-10-06 RX ADMIN — SODIUM CHLORIDE, SODIUM LACTATE, POTASSIUM CHLORIDE, AND CALCIUM CHLORIDE 125 ML/HR: 600; 310; 30; 20 INJECTION, SOLUTION INTRAVENOUS at 17:58

## 2020-10-06 RX ADMIN — Medication 100 MG: at 14:20

## 2020-10-06 RX ADMIN — Medication 2 MILLI-UNITS/MIN: at 08:45

## 2020-10-06 RX ADMIN — PROPOFOL 200 MG: 10 INJECTION, EMULSION INTRAVENOUS at 14:20

## 2020-10-06 RX ADMIN — ALBUTEROL SULFATE 2.5 MG: 2.5 SOLUTION RESPIRATORY (INHALATION) at 15:55

## 2020-10-06 RX ADMIN — HYDROMORPHONE HYDROCHLORIDE 0.5 MG: 2 INJECTION, SOLUTION INTRAMUSCULAR; INTRAVENOUS; SUBCUTANEOUS at 15:05

## 2020-10-06 RX ADMIN — HYDROMORPHONE HYDROCHLORIDE 0.5 MG: 2 INJECTION, SOLUTION INTRAMUSCULAR; INTRAVENOUS; SUBCUTANEOUS at 14:46

## 2020-10-06 RX ADMIN — Medication 10 MG: at 14:28

## 2020-10-06 RX ADMIN — Medication 5 MG: at 14:41

## 2020-10-06 RX ADMIN — HYDROMORPHONE HYDROCHLORIDE 0.5 MG: 2 INJECTION, SOLUTION INTRAMUSCULAR; INTRAVENOUS; SUBCUTANEOUS at 15:41

## 2020-10-06 NOTE — PERIOP NOTES
TRANSFER - OUT REPORT:    Verbal report given to Jeremy Nichols RN on Nati Busby  being transferred to 2006 for routine post - op       Report consisted of patients Situation, Background, Assessment and   Recommendations(SBAR). Information from the following report(s) SBAR, Procedure Summary, Intake/Output and MAR was reviewed with the receiving nurse. Lines:   Peripheral IV 10/05/20 Left;Posterior Forearm (Active)   Site Assessment Clean, dry, & intact 10/06/20 1540   Phlebitis Assessment 0 10/06/20 1540   Infiltration Assessment 0 10/06/20 1540   Dressing Status Clean, dry, & intact 10/06/20 1540   Dressing Type Tape;Transparent 10/06/20 1540   Hub Color/Line Status Pink; Infusing;Patent 10/06/20 1540   Action Taken Open ports on tubing capped 10/05/20 1937   Alcohol Cap Used Yes 10/06/20 0733        Opportunity for questions and clarification was provided.       Patient transported with:   Registered Nurse

## 2020-10-06 NOTE — PROGRESS NOTES
Bedside and Verbal shift change report given to Joni Muñoz RN (oncoming nurse) by Nancy Blandon (offgoing nurse). Report included the following information SBAR, Kardex, Intake/Output, MAR, Recent Results and Med Rec.

## 2020-10-06 NOTE — ANESTHESIA PROCEDURE NOTES
Epidural Block    Start time: 10/6/2020 11:29 AM  End time: 10/6/2020 11:51 AM  Performed by: Marquis Ayala MD  Authorized by: Marquis Ayala MD     Pre-Procedure  Indication: labor epidural    Preanesthetic Checklist: patient identified, risks and benefits discussed, anesthesia consent, site marked, patient being monitored, timeout performed and anesthesia consent      Epidural:   Patient position:  Seated  Prep region:  Lumbar  Location:  L3-4    Needle and Epidural Catheter:   Needle Type:  Tuohy  Needle Gauge:  18 G  Injection Technique:  Loss of resistance using air  Attempts:  3 (patient was moving a lot during the procedure requiring multiple attempts)  Catheter Size:  20 G  Catheter at Skin Depth (cm):  10  Depth in Epidural Space (cm):  3  Events: no blood with aspiration, no cerebrospinal fluid with aspiration, no paresthesia and negative aspiration test    Test Dose:  Negative    Assessment:   Catheter Secured:  Tegaderm and tape  Insertion:  Uncomplicated  Patient tolerance:  Patient tolerated the procedure well with no immediate complications (patient did not tolerate the local infiltration)

## 2020-10-06 NOTE — PROGRESS NOTES
Problem: Patient Education: Go to Patient Education Activity  Goal: Patient/Family Education  Outcome: Progressing Towards Goal     Problem: Vaginal Delivery: Day of Deliver-Laboring  Goal: Activity/Safety  Outcome: Progressing Towards Goal  Goal: Consults, if ordered  Outcome: Progressing Towards Goal  Goal: Diagnostic Test/Procedures  Outcome: Progressing Towards Goal  Goal: Nutrition/Diet  Outcome: Progressing Towards Goal  Goal: Discharge Planning  Outcome: Progressing Towards Goal  Goal: Medications  Outcome: Progressing Towards Goal  Goal: Respiratory  Outcome: Progressing Towards Goal  Goal: Treatments/Interventions/Procedures  Outcome: Progressing Towards Goal  Goal: *Vital signs within defined limits  Outcome: Progressing Towards Goal  Goal: *Labs within defined limits  Outcome: Progressing Towards Goal  Goal: *Hemodynamically stable  Outcome: Progressing Towards Goal  Goal: *Optimal pain control at patient's stated goal  Outcome: Progressing Towards Goal     Problem: Vaginal Delivery: Day of Delivery-Post delivery  Goal: Activity/Safety  Outcome: Progressing Towards Goal  Goal: Consults, if ordered  Outcome: Progressing Towards Goal  Goal: Nutrition/Diet  Outcome: Progressing Towards Goal  Goal: Discharge Planning  Outcome: Progressing Towards Goal  Goal: Medications  Outcome: Progressing Towards Goal  Goal: Treatments/Interventions/Procedures  Outcome: Progressing Towards Goal  Goal: *Vital signs within defined limits  Outcome: Progressing Towards Goal  Goal: *Labs within defined limits  Outcome: Progressing Towards Goal  Goal: *Hemodynamically stable  Outcome: Progressing Towards Goal  Goal: *Optimal pain control at patient's stated goal  Outcome: Progressing Towards Goal  Goal: *Participates in infant care  Outcome: Progressing Towards Goal  Goal: *Demonstrates progressive activity  Outcome: Progressing Towards Goal  Goal: *Tolerating diet  Outcome: Progressing Towards Goal     Problem: Vaginal Delivery: Postpartum Day 1  Goal: Activity/Safety  Outcome: Progressing Towards Goal  Goal: Consults, if ordered  Outcome: Progressing Towards Goal  Goal: Diagnostic Test/Procedures  Outcome: Progressing Towards Goal  Goal: Nutrition/Diet  Outcome: Progressing Towards Goal  Goal: Discharge Planning  Outcome: Progressing Towards Goal  Goal: Medications  Outcome: Progressing Towards Goal  Goal: Treatments/Interventions/Procedures  Outcome: Progressing Towards Goal  Goal: Psychosocial  Outcome: Progressing Towards Goal  Goal: *Vital signs within defined limits  Outcome: Progressing Towards Goal  Goal: *Labs within defined limits  Outcome: Progressing Towards Goal  Goal: *Hemodynamically stable  Outcome: Progressing Towards Goal  Goal: *Optimal pain control at patient's stated goal  Outcome: Progressing Towards Goal  Goal: *Participates in infant care  Outcome: Progressing Towards Goal  Goal: *Demonstrates progressive activity  Outcome: Progressing Towards Goal  Goal: *Performs self perineal care  Outcome: Progressing Towards Goal  Goal: *Appropriate parent-infant bonding  Outcome: Progressing Towards Goal  Goal: *Tolerating diet  Outcome: Progressing Towards Goal  Goal: *Performs self breast care  Outcome: Progressing Towards Goal     Problem: Pain  Goal: *Control of Pain  Outcome: Progressing Towards Goal     Problem: Patient Education: Go to Patient Education Activity  Goal: Patient/Family Education  Outcome: Progressing Towards Goal

## 2020-10-06 NOTE — PROGRESS NOTES
1925:  Spoke with pt re courses of the day. Pt states she has had 3 doses cytotec and feels it is not working, she is concerned because this medication has worked for her in the past.  Spoke with Dr Chiara Mac re patient conserns. Plan to place cook balloon and d/c cytotec    1930  Assumed care of pt from Marvin Cohen RN. Pt in room with no c/o pain at this time despite ctx q2min after two doses of cytotec earlier today. Dr Mariaa Cheng at bed for [facility] placement. Letitia Abads placed with 80 uterine and 80 cervical.  Pt tolerated well and now feels her pain level is 5/10.      2056:  Pt c/o pain 7/10 to lower back. PRN stadol administered.

## 2020-10-06 NOTE — OP NOTES
[address] [address]                              [address] [id number]                               OPERATIVE REPORT    PATIENT:       Lakshmi Tristan    MRN:              [id number]   D ATE:            10/6/2020      PREOPERATIVE DIAGNOSES  1. Intrauterine pregnancy at term. 2. Elective repeat  section. POSTOPERATIVE DIAGNOSES  1. Intrauterine pregnancy at term. 2.Cord prolapse  3. Compound presentation  4. IUGR  PROCEDURE PERFORMED: Primary low-transverse  section of a viable Female  infant. Surgical Assistant:  None    SURGEON: Tigre Gary MD    ANESTHESIA:  General Dr. Adrián Villeda: 500 mL. SPECIMEN: Placenta. Cord gases. FINDINGS: A viable Female infant delivered from the vertex presentation at 1, Apgar's 8  And 9 a weight of 2330 grams. There was clear  amniotic fluid with normal tubes and ovaries bilaterally. COMPLICATIONS: None    DESCRIPTION OF PROCEDURE:  The patient was brought to the operating room and correctly identified. The patient received 2 g of intravenous  Ancef on call for the operating room. The epidural was dosed without difficulty by Dr. Kylah Cornejo. The patient was then placed in the supine position with a roll  under her right hip. Sequential compression boots were placed on her legs  and a Navarro catheter was sterilely placed, which drained clear yellow  urine. Fetal heart tones were 140's prior to the start of the procedure. The  patient was sterilely prepped and draped. The epidural was tested and noted  to be inadequate for anesthesia. General anesthesia was administered without difficulty. A time out was performed prior to making the incision. Using the scalpel, a Pfannenstiel skin incision was made 3 fingerbreadths  above the symphysis pubis.  This was carried down  sharply and bluntly to the level of the fascia, which was nicked in the  midline and extended transversely with the Bovie. The superior and then  inferior rectus fascia was grasped with Kocher's, elevated and sharply and  bluntly dissected from the rectus muscles. The muscles were pulled  laterally to expose the peritoneum, which was bluntly entered. The  incision was extended transversely with the surgeon's fingers. The Kahlil O  retractor was placed into the peritoneal cavity. A bladder flap was  created with sharp and blunt dissection. The uterus was sharply incised  with a scalpel and extended superiorly and inferiorly with the surgeon's  fingers. There was clear amniotic fluid noted on entering the uterine  cavity. A viable Female was delivered from the vertex presentation at 1  with Apgar's of 8 and 9 and a weight of 2330 grams. The infant was  well suctioned. The cord was clamped and the infant was given to the  nursery staff. A cord blood specimen and cord gases were obtained. The placenta was  then manually extracted from the uterine cavity and the cavity was wiped  clean with a wet sponge. Intravenous Pitocin was administered. The uterus  was closed in 2 layers, the first a continuous, interlocking suture of 1 monocryl, and the second an imbricating suture of 1 monocryl. The bladder  flap was closed using a continuous running suture of 2-0 Vicryl. The ovaries and tubes were inspected and noted to be normal bilaterally and the  gutters were wiped free of blood. The Kahlil O retractor was removed from  the peritoneal cavity. The fascia was closed in one length using a  continuous running suture of #1 vicryl. The skin was closed with  Insorb staples. The incision was cleaned and dried and exofin was placed  on the skin. At the end of the procedure, all sharps, sponge, and instrument counts were  correct, and the Navarro was draining clear yellow urine. The patient was awakened from general anesthesia.   The patient tolerated the procedure well and she was taken to the recovery  room in stable condition.                                  Preliminary Unless Deysi Lopez MD        KKO:wmx  D: 05/07/2012 12:56 P   T: 05/07/2012  1:47 P  BY:  [id number]  [facility]#:  [id number]  [facility] [place] [facility]  [id number]    cc:   Aditi Wallace MD

## 2020-10-06 NOTE — PERIOP NOTES
Patient started wheezing. Briefed Dr. Lupe Carrion on the phone, and received orders for Albuterol nebulizer.

## 2020-10-06 NOTE — PROGRESS NOTES
Labor Progress Note  Patient seen, fetal heart rate and contraction pattern evaluated, patient examined. Patient is feeling mild contractions, mainly in her back. No data found. Physical Exam:  Cervical Exam:  2 cm dilated/50/-2    Membranes:  Artificial Rupture of Membranes; Amniotic Fluid: large amount of clear fluid  Uterine Activity: Frequency: Every 2 minutes  Fetal Heart Rate: Reactive  Pitocin at 8 mu. Assessment/Plan:  Reassuring fetal status, Continue plan for vaginal delivery. Patient desires epidural.  Will call anesthesia.

## 2020-10-06 NOTE — ANESTHESIA POSTPROCEDURE EVALUATION
Post-Anesthesia Evaluation and Assessment    Cardiovascular Function/Vital Signs  Visit Vitals  /68   Pulse 77   Temp 36.3 °C (97.4 °F)   Resp 15   Ht 5' 2\" (1.575 m)   Wt 52.6 kg (116 lb)   SpO2 100%   BMI 21.22 kg/m²       Patient is status post Procedure(s):   SECTION. Nausea/Vomiting: Controlled. Postoperative hydration reviewed and adequate. Pain:  Pain Scale 1: Visual (10/06/20 161)  Pain Intensity 1: 3 (10/06/20 161)   Managed. Neurological Status:   Neuro (WDL): Exceptions to WDL (10/06/20 1540)   At baseline. Mental Status and Level of Consciousness: Arousable. Pulmonary Status:   O2 Device: Nasal cannula (10/06/20 1540)   Adequate oxygenation and airway patent. Complications related to anesthesia: None    Post-anesthesia assessment completed. No concerns. Patient has met all discharge requirements.     Signed By: Harpreet Vasquez CRNA    2020

## 2020-10-06 NOTE — PERIOP NOTES
Visit Vitals  /80   Pulse 82   Temp 97.4 °F (36.3 °C)   Resp 17   Ht 5' 2\" (1.575 m)   Wt 52.6 kg (116 lb)   LMP 01/16/2020 (Approximate)   SpO2 100%   BMI 21.22 kg/m²           Date 10/05/20 0700 - 10/06/20 0659 10/06/20 0700 - 10/07/20 0659   Shift 0700-1859 1900-0659 24 Hour Total 2204-9632 8598-1705 24 Hour Total   INTAKE   I.V.(mL/kg/hr)    900  900     Volume (lactated Ringers infusion)    900  900   Shift Total(mL/kg)    900(17.1)  900(17.1)   OUTPUT   Urine(mL/kg/hr)  1550(2.5) 1550(1.2) 450  450     Urine Voided  1550 1550        Urine Occurrence(s) 3 x  3 x        Urine Output    450  450   Other    0  0     Other Output    0  0   Blood    500  500     Estimated Blood Loss    500  500   Shift Total(mL/kg)  1550(29.5) 1550(29.5) 950(18.1)  950(18.1)   NET  -1550 -1550 -50  -50   Weight (kg) 52.6 52.6 52.6 52.6 52.6 52.6

## 2020-10-06 NOTE — PROGRESS NOTES
Labor Progress Note  Patient seen, fetal heart rate and contraction pattern evaluated, patient examined. No data found. Physical Exam:  Cervical Exam:  5-6 cm dilated/60/-1. Fetal hand and prolapsing cord through cervix palpable. Membranes:  ruptured  Uterine Activity: Frequency: Every 2 minutes  Fetal Heart Rate: Decelerations: variable    Assessment/Plan:  Proceed with  Section for cord prolapse and compound presentation. Recommended proceeding with  delivery. Risks of bleeding, infection, bladder and bowel damage explained to patient and patient's family. They understand the situation and consent to the  delivery. OR and anesthesia, nursery have been notified. Orders placed.

## 2020-10-06 NOTE — PROGRESS NOTES
Labor Progress Note  Patient seen, fetal heart rate and contraction pattern evaluated, patient examined. No data found. Physical Exam:  Cervical Exam:  5 cm dilated/60/-1/fetal hand palpable    Membranes:  ruptured  Uterine Activity: Frequency: Every 2-4 minutes  Fetal Heart Rate: Decelerations: variable    Assessment/Plan:  Continue plan for vaginal delivery. Patient is requesting an epidural bolus.

## 2020-10-06 NOTE — PROGRESS NOTES
0725 Bedside and Verbal shift change report given to [facility], RN (oncoming nurse) by L. Lennox Sara, JENNIFER (offgoing nurse). Report included the following information SBAR, Kardex, Intake/Output, MAR and Recent Results. Patient resting in bed. Call bell within reach. No further needs at this time. Will continue to monitor. [address] removed. EFM and TOCO removed. Patient to order breakfast and shower. 3245 Patient called out stating she felt a gush of something. Nitrazine negative. SVE 4/50/-3 posterior. 7023 EFM and TOCO applied    0845 Pitocin started. 0 Dr. Heber Broderick at bedside discussing plan of care    1052 AROM large amount of clear fluid. SVE 2/50/-2    1055 Patient assisted to chair position. Patient requesting epidural. Fluid bolus started. 5 Dr. Rachid Mesa called and notified of epidural request.    36 Dr. Rachid Mesa at bedside explaining epidural procedure, side effects, risks, benefits, and positioning. Patient verbalizes understanding. Time-out: 1136  Procedure start: 1139  Catheter in, needle out: 1147  Test dose: 1146  Patient connected to pump: 1150    1241 SVE 6-7/60/-2. Compound presentation noted. 12 [facility] Dr. Heber Broderick notified of [address]. Heber Broderick at bedside. SVE 5/60/-1 compound presentation    0432 Patient called out and stated that she either needs to push or use the restroom. SVE unchanged compound hand and partially prolapsed cord noted. Call bell used to call for help    1358 Dr. Heber Broderick at bedside and able to perform SVE. Partially prolapsed cord note    1400 Emergency c-section called. 1405 Navarro placed    1407 Pitocin turned off.    1408 EFM and TOCO removed    1410 Patient rolled out of room on bed with CRNA, Dr. Rachid Mesa, and RN    87 Patient in [address] and transferred to table. Patient being placed under general anesthesia. 1500 Patient out of OR 2    1502 Patient in PACU for recovery.     1625 Patient brought back to room 6    1730 IV infiltrated    1732 IV attempted. Unsuccessful    65 Medic called for IV placement    1742 Medic at bedside for IV placement    1757 Infiltrated IV removed. Tip intact. 1810 Roxanna care provided. Pad and gown changed. 1830 TRANSFER - OUT REPORT:    Verbal report given to INEZ Davis RN (name) on Susie Ovalle  being transferred to mother/baby (unit) for routine progression of care       Report consisted of patients Situation, Background, Assessment and   Recommendations(SBAR). Information from the following report(s) SBAR, Kardex, Intake/Output, MAR and Recent Results was reviewed with the receiving nurse. Lines:   Peripheral IV 10/05/20 Left;Posterior Forearm (Active)   Site Assessment Clean, dry, & intact 10/06/20 1540   Phlebitis Assessment 0 10/06/20 1540   Infiltration Assessment 0 10/06/20 1540   Dressing Status Clean, dry, & intact 10/06/20 1540   Dressing Type Tape;Transparent 10/06/20 1540   Hub Color/Line Status Pink; Infusing;Patent 10/06/20 1540   Action Taken Open ports on tubing capped 10/05/20 1937   Alcohol Cap Used Yes 10/06/20 0733       Peripheral IV 10/06/20 Right Antecubital (Active)        Opportunity for questions and clarification was provided.       Patient transported with:   Registered Nurse

## 2020-10-06 NOTE — PROGRESS NOTES
1830 TRANSFER - IN REPORT:    Verbal report received from INEZ Rubio RN(name) on Taniya Bernardo  being received from L&D (unit) for routine progression of care      Report consisted of patients Situation, Background, Assessment and   Recommendations(SBAR). Information from the following report(s) SBAR, Kardex, OR Summary, Procedure Summary, Intake/Output, MAR and Recent Results was reviewed with the receiving nurse. Opportunity for questions and clarification was provided. 1835 VS obtained by Alma Mayfield CNA    1905 Bedside and Verbal shift change report given to BO Leal RN (oncoming nurse) by Sophy Del Cid. Master Ivan RN (offgoing nurse). Report included the following information SBAR, Kardex, OR Summary, Procedure Summary, Intake/Output, MAR and Recent Results.

## 2020-10-06 NOTE — ANESTHESIA PREPROCEDURE EVALUATION
Relevant Problems   No relevant active problems       Anesthetic History          Comments: Had bleeding of skin after previous epidural     Review of Systems / Medical History  Patient summary reviewed, nursing notes reviewed and pertinent labs reviewed    Pulmonary          Smoker  Asthma        Neuro/Psych         Psychiatric history     Cardiovascular                       GI/Hepatic/Renal                Endo/Other        Anemia     Other Findings   Comments: Sickle cell trait           Physical Exam    Airway  Mallampati: II  TM Distance: < 4 cm  Neck ROM: normal range of motion        Cardiovascular    Rhythm: regular  Rate: normal         Dental      Comments: Poor dentition; missing some and none loose   Pulmonary  Breath sounds clear to auscultation               Abdominal  GI exam deferred       Other Findings            Anesthetic Plan    ASA: 3  Anesthesia type: epidural            Anesthetic plan and risks discussed with: Patient

## 2020-10-07 LAB
HCT VFR BLD AUTO: 17.7 % (ref 35–45)
HCT VFR BLD AUTO: 22.9 % (ref 35–45)
HGB BLD-MCNC: 5.7 G/DL (ref 12–16)
HGB BLD-MCNC: 7.4 G/DL (ref 12–16)
HISTORY CHECKED?,CKHIST: NORMAL

## 2020-10-07 PROCEDURE — 85014 HEMATOCRIT: CPT

## 2020-10-07 PROCEDURE — 85018 HEMOGLOBIN: CPT

## 2020-10-07 PROCEDURE — 74011250636 HC RX REV CODE- 250/636: Performed by: OBSTETRICS & GYNECOLOGY

## 2020-10-07 PROCEDURE — P9016 RBC LEUKOCYTES REDUCED: HCPCS

## 2020-10-07 PROCEDURE — 36415 COLL VENOUS BLD VENIPUNCTURE: CPT

## 2020-10-07 PROCEDURE — 30233N1 TRANSFUSION OF NONAUTOLOGOUS RED BLOOD CELLS INTO PERIPHERAL VEIN, PERCUTANEOUS APPROACH: ICD-10-PCS | Performed by: OBSTETRICS & GYNECOLOGY

## 2020-10-07 PROCEDURE — 36430 TRANSFUSION BLD/BLD COMPNT: CPT

## 2020-10-07 PROCEDURE — 74011250637 HC RX REV CODE- 250/637: Performed by: OBSTETRICS & GYNECOLOGY

## 2020-10-07 PROCEDURE — 65270000029 HC RM PRIVATE

## 2020-10-07 RX ORDER — SODIUM CHLORIDE 9 MG/ML
250 INJECTION, SOLUTION INTRAVENOUS AS NEEDED
Status: DISCONTINUED | OUTPATIENT
Start: 2020-10-07 | End: 2020-10-09 | Stop reason: HOSPADM

## 2020-10-07 RX ORDER — LANOLIN ALCOHOL/MO/W.PET/CERES
1 CREAM (GRAM) TOPICAL EVERY OTHER DAY
Status: DISCONTINUED | OUTPATIENT
Start: 2020-10-07 | End: 2020-10-09 | Stop reason: HOSPADM

## 2020-10-07 RX ADMIN — SENNOSIDES 8.6 MG: 8.6 TABLET, FILM COATED ORAL at 22:10

## 2020-10-07 RX ADMIN — Medication 10 ML: at 22:00

## 2020-10-07 RX ADMIN — SENNOSIDES 8.6 MG: 8.6 TABLET, FILM COATED ORAL at 09:41

## 2020-10-07 RX ADMIN — KETOROLAC TROMETHAMINE 30 MG: 30 INJECTION, SOLUTION INTRAMUSCULAR at 23:14

## 2020-10-07 RX ADMIN — OXYCODONE HYDROCHLORIDE AND ACETAMINOPHEN 2 TABLET: 5; 325 TABLET ORAL at 17:13

## 2020-10-07 RX ADMIN — KETOROLAC TROMETHAMINE 30 MG: 30 INJECTION, SOLUTION INTRAMUSCULAR at 18:08

## 2020-10-07 RX ADMIN — KETOROLAC TROMETHAMINE 30 MG: 30 INJECTION, SOLUTION INTRAMUSCULAR at 00:05

## 2020-10-07 RX ADMIN — FERROUS SULFATE TAB 325 MG (65 MG ELEMENTAL FE) 325 MG: 325 (65 FE) TAB at 17:13

## 2020-10-07 RX ADMIN — ZOLPIDEM TARTRATE 5 MG: 5 TABLET ORAL at 01:14

## 2020-10-07 RX ADMIN — KETOROLAC TROMETHAMINE 30 MG: 30 INJECTION, SOLUTION INTRAMUSCULAR at 07:04

## 2020-10-07 RX ADMIN — KETOROLAC TROMETHAMINE 30 MG: 30 INJECTION, SOLUTION INTRAMUSCULAR at 12:18

## 2020-10-07 NOTE — PROGRESS NOTES
1905- Bedside and Verbal shift change report given to Sabrina Mtz RN   (oncoming nurse) by CHUCKY Oneal RN (offgoing nurse). Report included the following information SBAR, Kardex, Intake/Output, MAR, Recent Results and Quality Measures. 9132- Bedside and Verbal shift change report given to BO Goldberg RN (oncoming nurse) by Sabrina Mtz RN (offgoing nurse). Report included the following information SBAR, Kardex, Intake/Output, MAR, Recent Results and Quality Measures.

## 2020-10-07 NOTE — PROGRESS NOTES
Bedside shift change report given to TED Hernandez RN (oncoming nurse) by Rashad Norman RN   (offgoing nurse). Report given with SBAR, Kardex, MAR and Recent Results.

## 2020-10-07 NOTE — ROUTINE PROCESS
0530-Pt educated on blood transfusion including risk and benefits with verbal understanding voiced. Opportunity for questions given and pt denies issues or concerns and agreed to transfusion. 0715-Bedside and Verbal shift change report given to [facility] (oncoming nurse) by Diana Moss RN (offgoing nurse). Report given with SBAR, Kardex and MAR.

## 2020-10-07 NOTE — PROGRESS NOTES
Problem: Pain  Goal: *Control of Pain  Outcome: Progressing Towards Goal     Problem: Patient Education: Go to Patient Education Activity  Goal: Patient/Family Education  Outcome: Progressing Towards Goal     Problem: Lactation Care Plan  Goal: *Infant latching appropriately  Outcome: Progressing Towards Goal  Goal: *Weight loss less than 10% of birth weight  Outcome: Progressing Towards Goal     Problem: Patient Education: Go to Patient Education Activity  Goal: Patient/Family Education  Outcome: Progressing Towards Goal     Problem: Falls - Risk of  Goal: *Absence of Falls  Description: Document Deven Fall Risk and appropriate interventions in the flowsheet.   Outcome: Progressing Towards Goal  Note: Fall Risk Interventions:  Mobility Interventions: Patient to call before getting OOB         Medication Interventions: Patient to call before getting OOB, Teach patient to arise slowly    Elimination Interventions: Call light in reach, Patient to call for help with toileting needs              Problem: Patient Education: Go to Patient Education Activity  Goal: Patient/Family Education  Outcome: Progressing Towards Goal     Problem:  Delivery: Postpartum Day 1  Goal: Activity/Safety  Outcome: Progressing Towards Goal  Goal: Consults, if ordered  Outcome: Progressing Towards Goal  Goal: Diagnostic Test/Procedures  Outcome: Progressing Towards Goal  Goal: Nutrition/Diet  Outcome: Progressing Towards Goal  Goal: Discharge Planning  Outcome: Progressing Towards Goal  Goal: Medications  Outcome: Progressing Towards Goal  Goal: Respiratory  Outcome: Progressing Towards Goal  Goal: Treatments/Interventions/Procedures  Outcome: Progressing Towards Goal  Goal: Psychosocial  Outcome: Progressing Towards Goal  Goal: *Vital signs within defined limits  Outcome: Progressing Towards Goal  Goal: *Labs within defined limits  Outcome: Progressing Towards Goal  Goal: *Hemodynamically stable  Outcome: Progressing Towards Goal  Goal: *Optimal pain control at patient's stated goal  Outcome: Progressing Towards Goal  Goal: *Participates in infant care  Outcome: Progressing Towards Goal  Goal: *Demonstrates progressive activity  Outcome: Progressing Towards Goal  Goal: *Tolerating diet  Outcome: Progressing Towards Goal  Goal: *Performs self perineal care  Outcome: Progressing Towards Goal

## 2020-10-07 NOTE — PROGRESS NOTES
1500  Bedside and Verbal shift change report given to CHUCKY Oneal, RN (oncoming nurse) by Татьяна Carmona, JENNIFER (offgoing nurse). Report included the following information SBAR, Kardex, Intake/Output, MAR and Recent Results. 1615  Completed assessment, no further needs at this time. 1640  Notified Dr. Fabiola Blue of clot passed. No orders received. H&H repeat already scheduled in am.    1710  Rounded on patient. Gave PRN pain medication and scheduled iron. No further needs at this time. 1750  Reassessed pain, no further needs at this time. 1805  Gave scheduled toradol, no further needs at this time. 1905  Bedside and Verbal shift change report given to VICENTE Washington (oncoming nurse) by CHUCKY Oneal RN (offgoing nurse). Report included the following information SBAR, Kardex, Intake/Output, MAR and Recent Results.

## 2020-10-07 NOTE — PROGRESS NOTES
Received bedside report from LUIS Hernandez RN. using MAR, OB Kardex, labs and OB summary. Assumed care of mom and baby. Pain level assessed with patient pain goals established. Plan of care for today reviewed and opportunity for questions given and questions answered. Discharge process reviewed and patient verbalizes good understanding. 4803 Blood transfusion started. Patient is very drowsy, rceieved [place] at [address]. Responds when spoken to. Will stay with patient for first 15 minutes of transfusion as per prococol. 1355 In patient's room to get her up OOb to bathroom and remove hui. Patient is on the phone and wants me to come back. [address] to room by patient. Patient states that it will hurt too much to try and walk with a hui. Requesting that I take hui out and then wlk her to the bathroom. Hui removed with 250 urine in bag. Patient states she is on a \"Very important phone call\" and she will call me.

## 2020-10-07 NOTE — ROUTINE PROCESS
Bedside and Verbal shift change report given to CHUCKY Oneal RN  by Alonso Holder RN . Report given with TROY, John and MAR.

## 2020-10-07 NOTE — LACTATION NOTE
This note was copied from a baby's chart. 1600 Mom in PACU at this time. 200 Mom educated on breastfeeding basics--hunger cues, feeding on demand, waking baby if baby sleeps too long between feeds, importance of skin to skin, positioning and latching, risk of pacifier use and supplemental feedings, and importance of rooming in--and use of log sheet. Mom also educated on benefits of breastfeeding for herself and baby. Mom verbalized understanding. No questions at this time.  infant latched and nursing well 12 minutes. Placed  skin to skin with mom after feeding.

## 2020-10-07 NOTE — PROGRESS NOTES
Post-Operative  Day 1    Joe Gee     Assessment:   Hospital Problems  Date Reviewed: 10/6/2020          Codes Class Noted POA    Compound presentation of fetus palpable vaginally ICD-10-CM: O32. 6XX0  ICD-9-CM: 652.80  10/6/2020 Unknown        Cord prolapse ICD-10-CM: O69. 0XX0  ICD-9-CM: 663.00  10/6/2020 No        S/P  section ICD-10-CM: D16.186  ICD-9-CM: V45.89  10/6/2020 No        * (Principal) IUGR (intrauterine growth restriction) affecting care of mother ICD-10-CM: O36.5990  ICD-9-CM: 656.50  10/5/2020 Yes        Susceptible to varicella (non-immune), currently pregnant ICD-10-CM: O09.899, Z28.3  ICD-9-CM: V49.89  2020 Yes    Overview Signed 2020  6:47 AM by Freda Lynn MD     Vaccinate postpartum             Anemia in pregnancy, third trimester ICD-10-CM: O99.013  ICD-9-CM: 648.23  2020 Yes    Overview Addendum 2020  8:22 PM by NEGRITO Feldman     Hgb 2020: 8.5.   20 8.6  Ferrous sulfate started. 2020: 7.2 (POC)- ordered iron injections. Marijuana use ICD-10-CM: F12.90  ICD-9-CM: 305.20  2020 Yes    Overview Addendum 2020  8:22 PM by NEGRITO Feldman     UDS is positive for cannabinoids in 2nd trimester. Repeat UDS in 3rd trimester. UDS negative 2020             Asthma during pregnancy ICD-10-CM: O99.519, J45.909  ICD-9-CM: 648.93, 493.90  2020 Yes    Overview Signed 2020  9:34 PM by Rocío Porter NP     No medications x6 years                 Post-Op day 1, stable    Plan:   1. Routine post-operative care   2. Will recheck H and H after first unit of blood. May need a second transfusion. Information for the patient's :  Lynda Grimes [[id number]]   , Low Transverse    Patient doing well without significant complaint. Nausea and vomiting resolved, tolerating diet, no flatus, hui removed.     Current Facility-Administered Medications   Medication Dose Route Frequency    ketorolac (TORADOL) injection 30 mg  30 mg IntraVENous Q6H    sodium chloride (NS) flush 5-40 mL  5-40 mL IntraVENous Q8H    lactated Ringers infusion  125 mL/hr IntraVENous CONTINUOUS    sodium chloride (NS) flush 5-40 mL  5-40 mL IntraVENous Q8H    HYDROmorphone (DILAUDID) 30 mg / 30 mL PCA in water   IntraVENous CONTINUOUS    senna (SENOKOT) tablet 8.6 mg  1 Tab Oral BID        Vitals:  Visit Vitals  BP (!) 95/54 (BP 1 Location: Left arm, BP Patient Position: At rest)   Pulse 87   Temp 98.6 °F (37 °C)   Resp 17   Ht 5' 2\" (1.575 m)   Wt 116 lb (52.6 kg)   LMP 2020 (Approximate)   SpO2 98%   Breastfeeding Unknown   BMI 21.22 kg/m²     Temp (24hrs), Av.1 °F (36.7 °C), Min:97.2 °F (36.2 °C), Max:98.9 °F (37.2 °C)      Last 24hr Input/Output:    Intake/Output Summary (Last 24 hours) at 10/7/2020 0959  Last data filed at 10/7/2020 0415  Gross per 24 hour   Intake 900 ml   Output 2550 ml   Net -1650 ml          Exam:        Patient without distress. Lungs clear. Abdomen, bowel sounds present, soft, expected tenderness, fundus firm, incision is intact     Perineum normal lochia noted               Lower extremities are negative for swelling, cords or tenderness.     Labs:   Lab Results   Component Value Date/Time    WBC 6.2 10/05/2020 08:10 AM    WBC 6.9 2020 03:35 PM    WBC 13.5 (H) 2019 03:40 AM    HGB 5.7 (LL) 10/07/2020 03:00 AM    HGB 8.3 (L) 10/05/2020 08:10 AM    HGB 7.8 (L) 10/01/2020 12:34 PM    HCT 17.7 (LL) 10/07/2020 03:00 AM    HCT 26.6 (L) 10/05/2020 08:10 AM    HCT 26.9 (L) 2020 03:35 PM    PLATELET 734  08:10 AM    PLATELET 995  03:35 PM    PLATELET 090  03:40 AM       Recent Results (from the past 24 hour(s))   HEMOGLOBIN    Collection Time: 10/07/20  3:00 AM   Result Value Ref Range    HGB 5.7 (LL) 12.0 - 16.0 g/dL   HEMATOCRIT    Collection Time: 10/07/20  3:00 AM   Result Value Ref Range    HCT 17.7 (LL) 35.0 - 45.0 % RBC, ALLOCATE    Collection Time: 10/07/20  4:45 AM   Result Value Ref Range    HISTORY CHECKED?  Historical check performed

## 2020-10-07 NOTE — LACTATION NOTE
Per mom, has not  since yesterday due to \"being sleepy\" and blood transfusion. Supply and demand discussed. WIll page.

## 2020-10-08 PROBLEM — O36.5990 IUGR (INTRAUTERINE GROWTH RESTRICTION) AFFECTING CARE OF MOTHER: Status: RESOLVED | Noted: 2020-10-05 | Resolved: 2020-10-08

## 2020-10-08 PROBLEM — O99.519 ASTHMA DURING PREGNANCY: Status: RESOLVED | Noted: 2020-05-27 | Resolved: 2020-10-08

## 2020-10-08 PROBLEM — Z28.39 SUSCEPTIBLE TO VARICELLA (NON-IMMUNE), CURRENTLY PREGNANT: Status: RESOLVED | Noted: 2020-08-22 | Resolved: 2020-10-08

## 2020-10-08 PROBLEM — O23.43 URINARY TRACT INFECTION IN MOTHER DURING THIRD TRIMESTER OF PREGNANCY: Status: RESOLVED | Noted: 2020-08-25 | Resolved: 2020-10-08

## 2020-10-08 PROBLEM — O09.292 HISTORY OF GESTATIONAL DIABETES IN PRIOR PREGNANCY, CURRENTLY PREGNANT IN SECOND TRIMESTER: Status: RESOLVED | Noted: 2020-05-27 | Resolved: 2020-10-08

## 2020-10-08 PROBLEM — O09.899 SUSCEPTIBLE TO VARICELLA (NON-IMMUNE), CURRENTLY PREGNANT: Status: RESOLVED | Noted: 2020-08-22 | Resolved: 2020-10-08

## 2020-10-08 PROBLEM — J45.909 ASTHMA DURING PREGNANCY: Status: RESOLVED | Noted: 2020-05-27 | Resolved: 2020-10-08

## 2020-10-08 PROBLEM — O09.892 HISTORY OF PRETERM DELIVERY, CURRENTLY PREGNANT IN SECOND TRIMESTER: Status: RESOLVED | Noted: 2020-05-27 | Resolved: 2020-10-08

## 2020-10-08 PROBLEM — O09.33 INSUFFICIENT PRENATAL CARE IN THIRD TRIMESTER: Status: RESOLVED | Noted: 2020-08-14 | Resolved: 2020-10-08

## 2020-10-08 PROBLEM — O36.5930 POOR FETAL GROWTH AFFECTING MANAGEMENT OF MOTHER IN THIRD TRIMESTER: Status: RESOLVED | Noted: 2020-08-14 | Resolved: 2020-10-08

## 2020-10-08 PROBLEM — Z86.32 HISTORY OF GESTATIONAL DIABETES IN PRIOR PREGNANCY, CURRENTLY PREGNANT IN SECOND TRIMESTER: Status: RESOLVED | Noted: 2020-05-27 | Resolved: 2020-10-08

## 2020-10-08 PROBLEM — O09.42 GRAND MULTIPARITY WITH CURRENT PREGNANCY IN SECOND TRIMESTER: Status: RESOLVED | Noted: 2019-01-09 | Resolved: 2020-10-08

## 2020-10-08 PROBLEM — O99.013 ANEMIA IN PREGNANCY, THIRD TRIMESTER: Status: RESOLVED | Noted: 2020-08-14 | Resolved: 2020-10-08

## 2020-10-08 LAB
ABO + RH BLD: NORMAL
BLD PROD TYP BPU: NORMAL
BLOOD GROUP ANTIBODIES SERPL: NORMAL
BPU ID: NORMAL
CALLED TO:,BCALL1: NORMAL
CROSSMATCH RESULT,%XM: NORMAL
HCT VFR BLD AUTO: 22.9 % (ref 35–45)
HGB BLD-MCNC: 7.4 G/DL (ref 12–16)
SPECIMEN EXP DATE BLD: NORMAL
STATUS OF UNIT,%ST: NORMAL
UNIT DIVISION, %UDIV: 0

## 2020-10-08 PROCEDURE — 36415 COLL VENOUS BLD VENIPUNCTURE: CPT

## 2020-10-08 PROCEDURE — 65270000029 HC RM PRIVATE

## 2020-10-08 PROCEDURE — 74011250637 HC RX REV CODE- 250/637: Performed by: OBSTETRICS & GYNECOLOGY

## 2020-10-08 PROCEDURE — 85018 HEMOGLOBIN: CPT

## 2020-10-08 PROCEDURE — 74011250636 HC RX REV CODE- 250/636: Performed by: OBSTETRICS & GYNECOLOGY

## 2020-10-08 RX ORDER — IBUPROFEN 400 MG/1
800 TABLET ORAL
Status: DISCONTINUED | OUTPATIENT
Start: 2020-10-08 | End: 2020-10-09 | Stop reason: HOSPADM

## 2020-10-08 RX ORDER — OXYCODONE AND ACETAMINOPHEN 5; 325 MG/1; MG/1
1 TABLET ORAL
Qty: 10 TAB | Refills: 0 | Status: SHIPPED | OUTPATIENT
Start: 2020-10-08 | End: 2020-10-11

## 2020-10-08 RX ORDER — IBUPROFEN 800 MG/1
800 TABLET ORAL
Qty: 30 TAB | Refills: 1 | Status: SHIPPED | OUTPATIENT
Start: 2020-10-09

## 2020-10-08 RX ADMIN — OXYCODONE HYDROCHLORIDE AND ACETAMINOPHEN 2 TABLET: 5; 325 TABLET ORAL at 04:01

## 2020-10-08 RX ADMIN — IBUPROFEN 800 MG: 400 TABLET, FILM COATED ORAL at 20:23

## 2020-10-08 RX ADMIN — BENZOCAINE AND MENTHOL 1 LOZENGE: 15; 3.6 LOZENGE ORAL at 23:00

## 2020-10-08 RX ADMIN — ACETAMINOPHEN 650 MG: 325 TABLET ORAL at 13:50

## 2020-10-08 RX ADMIN — SENNOSIDES 8.6 MG: 8.6 TABLET, FILM COATED ORAL at 08:38

## 2020-10-08 RX ADMIN — SENNOSIDES 8.6 MG: 8.6 TABLET, FILM COATED ORAL at 20:23

## 2020-10-08 RX ADMIN — KETOROLAC TROMETHAMINE 30 MG: 30 INJECTION, SOLUTION INTRAMUSCULAR at 05:43

## 2020-10-08 RX ADMIN — BENZOCAINE AND MENTHOL 1 LOZENGE: 15; 3.6 LOZENGE ORAL at 05:43

## 2020-10-08 RX ADMIN — OXYCODONE HYDROCHLORIDE AND ACETAMINOPHEN 2 TABLET: 5; 325 TABLET ORAL at 17:19

## 2020-10-08 NOTE — LACTATION NOTE
Infant latched and nursing well at 0823--has a lot of colostrum. Breastfeeding discharge teaching completed to include feeding on demand, foremilk and hindmilk importance, engorgement, mastitis, clogged ducts, pumping, breastmilk storage, and returning to work. Information given about unit and office phone numbers and encouraged mom to reach out if concerns arise, but that [facility] would be calling her in the next few days to follow up on breastfeeding. Mom verbalized understanding and no questions at this time.

## 2020-10-08 NOTE — PROGRESS NOTES
Post-Operative  Day 2    Joe Flores Aristidesdai       Assessment:   Hospital Problems  Date Reviewed: 10/6/2020          Codes Class Noted POA    Compound presentation of fetus palpable vaginally ICD-10-CM: O32. 6XX0  ICD-9-CM: 652.80  10/6/2020 Unknown        Cord prolapse ICD-10-CM: O69. 0XX0  ICD-9-CM: 663.00  10/6/2020 No        S/P  section ICD-10-CM: C91.585  ICD-9-CM: V45.89  10/6/2020 No        * (Principal) IUGR (intrauterine growth restriction) affecting care of mother ICD-10-CM: O36.5990  ICD-9-CM: 656.50  10/5/2020 Yes        Susceptible to varicella (non-immune), currently pregnant ICD-10-CM: O09.899, Z28.3  ICD-9-CM: V49.89  2020 Yes    Overview Signed 2020  6:47 AM by Puneet Lynn MD     Vaccinate postpartum             Anemia in pregnancy, third trimester ICD-10-CM: O99.013  ICD-9-CM: 648.23  2020 Yes    Overview Addendum 2020  8:22 PM by NEGRITO Page     Hgb 2020: 8.5.   20 8.6  Ferrous sulfate started. 2020: 7.2 (POC)- ordered iron injections. Marijuana use ICD-10-CM: F12.90  ICD-9-CM: 305.20  2020 Yes    Overview Addendum 2020  8:22 PM by NEGRITO Page     UDS is positive for cannabinoids in 2nd trimester. Repeat UDS in 3rd trimester. UDS negative 2020             Asthma during pregnancy ICD-10-CM: O99.519, J45.909  ICD-9-CM: 648.93, 493.90  2020 Yes    Overview Signed 2020  9:34 PM by Cassondra Dubin, NP     No medications x6 years                 Post-Op day 2, doing well    Plan:   1. Discharge home today  2. Follow up in office in 6 weeks with Puneet Lynn MD  3. Post partum activity/wound care advised, diet as tolerated  4. Discharge Medications: ibuprofen, percocet and medications prior to admission      Information for the patient's :  Jamison Soria [[id number]]   , Low Transverse    Patient doing well without significant complaint. Tolerating diet, passing flatus, voiding and ambulating without difficulty. No BM yet. Is breast feeding and pumping. Desires discharge today if baby passes car seat test. Denies dizziness or lightheadedness. Current Facility-Administered Medications   Medication Dose Route Frequency    ferrous sulfate tablet 325 mg  1 Tab Oral EVERY OTHER DAY    ketorolac (TORADOL) injection 30 mg  30 mg IntraVENous Q6H    sodium chloride (NS) flush 5-40 mL  5-40 mL IntraVENous Q8H    sodium chloride (NS) flush 5-40 mL  5-40 mL IntraVENous Q8H    HYDROmorphone (DILAUDID) 30 mg / 30 mL PCA in water   IntraVENous CONTINUOUS    senna (SENOKOT) tablet 8.6 mg  1 Tab Oral BID       Vitals:  Visit Vitals  BP (!) 105/56 (BP 1 Location: Right arm, BP Patient Position: At rest)   Pulse (!) 56   Temp 97.9 °F (36.6 °C)   Resp 16   Ht 5' 2\" (1.575 m)   Wt 116 lb (52.6 kg)   LMP 2020 (Approximate)   SpO2 100%   Breastfeeding Unknown   BMI 21.22 kg/m²     Temp (24hrs), Av.3 °F (36.8 °C), Min:97.9 °F (36.6 °C), Max:98.6 °F (37 °C)        Exam:        Patient without distress. Abdomen, bowel sounds present, soft, expected tenderness, fundus firm                Wound incision clean, dry and intact               Lower extremities are negative for swelling, cords or tenderness.     Labs:   Lab Results   Component Value Date/Time    WBC 6.2 10/05/2020 08:10 AM    WBC 6.9 2020 03:35 PM    WBC 13.5 (H) 2019 03:40 AM    HGB 7.4 (L) 10/08/2020 02:40 AM    HGB 7.4 (L) 10/07/2020 12:15 PM    HGB 5.7 (LL) 10/07/2020 03:00 AM    HCT 22.9 (L) 10/08/2020 02:40 AM    HCT 22.9 (L) 10/07/2020 12:15 PM    HCT 17.7 (LL) 10/07/2020 03:00 AM    PLATELET 813  08:10 AM    PLATELET 394  03:35 PM    PLATELET 662 6601 03:40 AM       Recent Results (from the past 24 hour(s))   HGB & HCT    Collection Time: 10/07/20 12:15 PM   Result Value Ref Range    HGB 7.4 (L) 12.0 - 16.0 g/dL    HCT 22.9 (L) 35.0 - 45.0 %   HGB & HCT    Collection Time: 10/08/20  2:40 AM   Result Value Ref Range    HGB 7.4 (L) 12.0 - 16.0 g/dL    HCT 22.9 (L) 35.0 - 45.0 %

## 2020-10-08 NOTE — LACTATION NOTE
This note was copied from a baby's chart. 5 infant latched and nursing well at this time. No questions or concerns.

## 2020-10-08 NOTE — PROGRESS NOTES
3358 Bedside and Verbal shift change report given to BO Goldberg RN (preceptor) and Cynthia Irby RN (oncoming nurse) by Yaneth Coombs RN (offgoing nurse). Report included the following information SBAR, Kardex, OR Summary, Procedure Summary, Intake/Output, MAR and Recent Results.

## 2020-10-08 NOTE — PROGRESS NOTES
Discharge teaching complete. Discussed with patient the following information; normal vaginal bleeding and what to expect, how to care for perineum and how to respond should she experience an increase in vaginal bleeding. Patient instructed that she should not lift more than the weight of her baby for at least a week. She was encouraged to stay hydrated and informed that she should not have sex, douche, use tampons,  or place anything in the vagina until her postpartum visit. Additionally she was instructed not to use tub baths, hot tubs or pools until cleared by her midwife or physician. Discussed with patient s/sx of DVT. Patient was informed that some swelling of her legs can be expected after delivery and to elevate them whenever possible. If the swelling increases or she has signs of calf pain/tenderness, or redness that is present in one leg more than the other she is to notify her provider or present in the emergency department for evaluation if unable to reach provider. Patient was given signs and symptoms of infection and instructed to call provider with temperature equal to or > than 100.4, foul smelling blood or discharge from the vagina. Patient was also instructed on the signs of postpartum depression and instructed that if she is considering harming herself or her infant, feels out of control, unable to care for herself or baby, feels sad or depressed most of the day every day, is having trouble sleeping or sleeping too much or is having trouble bonding with her baby she is to call her provider or present in the emergency department. Patient was also provided with s/sx of a pulmonary embolism (PE). She was told what a PE is and instructed to call 911 if she experiences SOB (fast, shallow, rapid respirations) at rest, chest pain that worsens when coughing or change in her level of consciousness.   Patient was informed that she might experience blood pressure changes during the postpartum weeks and that she should contact her provider with any severe, constant headaches that do not respond to over-the-counter pain medication, rest and/or hydration. She is also to call her provider with any changes in vision, seeing spots, or flashing lights, pain in the upper right quadrant of the abdomen, swelling of the face, hands, and/or legs more than what is expected or a change in her level of consciousness. Patient instructed to call her obstetrician to schedule an appointment for 2 and 6 weeks for incision and postpartum check. And emphasized the importance of telling all providers her delivery date up until one year after the birth of her baby. All questions were answered and patient voiced understanding of the information provided.

## 2020-10-08 NOTE — DISCHARGE INSTRUCTIONS
Patient Education        POST DELIVERY DISCHARGE INSTRUCTIONS    Name: Nikole Beltran  YOB: 1995  Primary Diagnosis: Active Problems:    Marijuana use (2020)      Overview: UDS is positive for cannabinoids in 2nd trimester. Repeat UDS in 3rd       trimester. UDS negative 2020      Compound presentation of fetus palpable vaginally (10/6/2020)      Cord prolapse (10/6/2020)      S/P  section (10/6/2020)       FOLLOW UP WITH OB IN 2 WEEKS   General:     Diet/Diet Restrictions:  Eight 8-ounce glasses of fluid daily (water, juices); avoid excessive caffeine intake. Meals/snacks as desired which are high in fiber and carbohydrates and low in fat and cholesterol. Physical Activity / Restrictions / Safety:     Avoid heavy lifting, no more that 8 lbs. For 2-3 weeks; limit use of stairs to 2 times daily for the first week home. No driving for one week. Avoid intercourse 4-6 weeks, no douching or tampon use. Check with obstetrician before starting or resuming an exercise program.         Discharge Instructions/Special Treatment/Home Care Needs:     Continue prenatal vitamins. Continue to use squirt bottle with warm water on your episiotomy after each bathroom use until bleeding stops. If steri-strips applied to your incision, remove in 7-10 days. Call your doctor for the following:     Fever over 100.4 degrees by mouth. Vaginal bleeding heavier than a normal menstrual period or clot larger than a golf ball. Red streaks or increased swelling of legs, painful red streaks on your breast.  Painful urination, constipation and increased pain or swelling or discharge with your incision. If you feel extremely anxious or overwhelmed. If you have thoughts of harming yourself and/or your baby. Pain Management:     Pain Management:   Take Acetaminophen (Tylenol) or Ibuprofen (Advil, Motrin), as directed for pain.  Use a warm Sitz bath 3 times daily to relieve episiotomy or hemorrhoidal discomfort. Heating pad to  incision as needed. For hemorrhoidal discomfort, use Tucks and Anusol cream as needed and directed. Follow-Up Care: These are general instructions for a healthy lifestyle:    No smoking/ No tobacco products/ Avoid exposure to second hand smoke    Surgeon General's Warning:  Quitting smoking now greatly reduces serious risk to your health. Obesity, smoking, and sedentary lifestyle greatly increases your risk for illness    A healthy diet, regular physical exercise & weight monitoring are important for maintaining a healthy lifestyle    Recognize signs and symptoms of STROKE:    F-face looks uneven    A-arms unable to move or move unevenly    S-speech slurred or non-existent    T-time-call 911 as soon as signs and symptoms begin-DO NOT go       Back to bed or wait to see if you get better-TIME IS BRAIN. Learning About Blood Transfusions  What is a blood transfusion? Blood transfusion is a medical treatment to replace the blood or parts of blood that your body has lost. The blood goes through a tube from a bag to an intravenous (IV) catheter and into your vein. You may need a blood transfusion after losing blood from an injury, a major surgery, an illness that causes bleeding, or an illness that destroys blood cells. Transfusions are also used to give you the parts of blood--such as platelets, plasma, or substances that cause clotting--that your body needs to fight an illness or stop bleeding. How is a blood transfusion done? Before you receive a blood transfusion, your blood is tested to find out what your blood type is. Blood or blood parts that are a match with your blood type are ordered by your doctor. Blood is typed as A, B, AB, or O. It is also typed as Rh-positive or Rh-negative. Your blood is also screened to look for antibodies that might react with the blood that is given to you.  The blood you are getting is checked and rechecked to make sure that it's the right type for you. A sample of your blood is mixed with a sample of the blood you will receive to check for problems. Before actually giving you the transfusion, a doctor and nurses will look at the label on the package of blood and compare it to your hospital ID bracelet and medical records. The transfusion begins only when all agree that this is the correct blood and that you are the correct person to receive it. To receive the transfusion, you will have an intravenous (IV) catheter inserted into a vein. A tube connects the catheter to the bag containing the blood, which is placed higher than your body. The blood then flows slowly into your vein. A doctor or nurse will check you several times during the transfusion to watch for a reaction or other problems. What are the possible risks? Blood transfusions have many benefits and are often life-saving. But they also have a few risks. Possible risks include:  · Your body's reaction to receiving new blood. This may include:  ? Fever. ? Allergic reactions. ? Breathing problems. · An infection from the blood. This risk is small because of the strict rules placed on handling and storing blood. Getting a viral infection, such as HIV or hepatitis B or C, through blood transfusions has become very rare. The U.S. Food and Drug Administration (FDA) enforces strict guidelines on the collection, testing, storage, and use of blood. · Getting the wrong blood type by accident. Severe reactions, which can be life-threatening, are very rare. How can you care for yourself at home? To prevent infection at the transfusion site  · Wash the area daily with warm, soapy water, and pat it dry. Don't use hydrogen peroxide or alcohol, which can slow healing. You may cover the area with a gauze bandage if it weeps or rubs against clothing. Change the bandage every day. · Keep the area clean and dry. When should you call for help?   Call 911 anytime you think you may need emergency care. For example, call if:  · You have severe trouble breathing. Call your doctor now or seek immediate medical care if:  · You have a fever. · You feel weaker or more tired than usual.  · You have a yellow tint to your skin or the whites of your eyes. Watch closely for changes in your health, and be sure to contact your doctor if you have any problems. Follow-up care is a key part of your treatment and safety. Be sure to make and go to all appointments, and call your doctor if you are having problems. It's also a good idea to know your test results and keep a list of the medicines you take. Where can you learn more? Go to http://www.gray.com/  Enter V588 in the search box to learn more about \"Learning About Blood Transfusions. \"  Current as of: November 8, 2019               Content Version: 12.6  © 3493-9585 [employer]. Care instructions adapted under license by [employer] (which disclaims liability or warranty for this information). If you have questions about a medical condition or this instruction, always ask your healthcare professional. [address] any warranty or liability for your use of this information.

## 2020-10-08 NOTE — ROUTINE PROCESS
0670: Bedside and Verbal shift change report given to Jeet Winchester RN (oncoming nurse) by S. Orlin Goldmann, RN (offgoing nurse). Report included the following information SBAR, Kardex, Procedure Summary, Intake/Output, MAR and Recent Results. 6763: Shift assessment complete. Patient denies headache, visual disturbances, and right epigastic pain at this time. Breath sounds clear bilaterally. Patient is passing gas, bowel sounds active, with last BM being 10/5/20. Fundus firm at -1 with scant lochia, no clots noted on assessment. IV site is 20 gauge in the right AC. No edema in upper extremities, trace in lower extremities. Patient free of clonus in lower extremities and denying any pain/tenderness behind knees or in calves. Patient rating pain 4/10 and no pain medication wanted at this time. Patient educated to notify nursing staff of: SOB, chest pain/heaviness, blurred vision, lightheadedness, increase in bleeding, and passing of clots, patient verbalized understanding. 1050: Rounds made; no needs at this time. 1329: Mother scheduled appointment at [facility] Monday at [address] [facility] for discharge purposes and mother's inability to make a Friday appointment or to be picked up if both baby and her got discharged tomorrow. Called Dr. Ela Jones for an update. He stated he would assess baby and the situation. 1354: Patient asked for Tylenol for 7/10 cramping pain in abdomen and burning in incision. I asked if patient wanted scheduled Toradol, which she refused. IV removed. No other needs at this time. 1755: Reassessment completed. Patient firm at U with scant rubra. Pain level is a 4/10 with no other pain medication needed at this time. 1910:Bedside and Verbal shift change report given to VICENTE Zhu (oncoming nurse) by Jeet Winchester RN (offgoing nurse). Report included the following information SBAR, Kardex, Procedure Summary, MAR and Recent Results. Patient called numerous times to schedule with Sleep Medicine. Letter mailed and referral removed from the sleep work que.

## 2020-10-08 NOTE — DISCHARGE SUMMARY
Obstetrical Discharge Summary     Name: Evan Jade MRN: [id number]  SSN: [id number]    YOB: 1995  Age: 31 y.o. Sex: female      Admit Date: 10/5/2020    Discharge Date: 10/8/2020    Admitting Physician: Hardy Caba MD     Attending Physician:  Tameka Jones MD     Discharge Diagnoses: s/p primary low transverse  section. Acute blood loss anemia, transfusion of packed red blood cells times one. Information for the patient's :  Sonya Rivero [[id number]]   Delivery of a 5 lb 2.2 oz (2.33 kg) female infant via , Low Transverse on 10/6/2020 at 2:24 PM  by Leona Perkins. Apgars were 8  and 9 . Additional Diagnoses:   Problem List as of 10/8/2020 Date Reviewed: 10/8/2020          Codes Class Noted - Resolved    Compound presentation of fetus palpable vaginally ICD-10-CM: O32. 6XX0  ICD-9-CM: 652.80  10/6/2020 - Present        Cord prolapse ICD-10-CM: O69. 0XX0  ICD-9-CM: 663.00  10/6/2020 - Present        S/P  section ICD-10-CM: T38.052  ICD-9-CM: V45.89  10/6/2020 - Present        History of prior pregnancy with IUGR  ICD-10-CM: Z87.59  ICD-9-CM: V13.29  2020 - Present    Overview Signed 2020  2:03 PM by Tameka Jones MD     20 EFW 25%, female fetus. Current smoker ICD-10-CM: F17.200  ICD-9-CM: 305.1  2020 - Present    Overview Signed 2020  9:35 PM by Lisandra Alberto NP     Smokes <1/2 pack of cigarettes per day. Marijuana use ICD-10-CM: F12.90  ICD-9-CM: 305.20  2020 - Present    Overview Addendum 2020  8:22 PM by NEGRITO Cedeño     UDS is positive for cannabinoids in 2nd trimester. Repeat UDS in 3rd trimester.   UDS negative 2020             Sickle cell trait ([address] ICD-10-CM: D57.3  ICD-9-CM: 282.5  2014 - Present    Overview Signed 2020 10:15 PM by Lisandra Alberto NP     Recommended testing for FOB             * (Principal) RESOLVED: IUGR (intrauterine growth restriction) affecting care of mother ICD-10-CM: O36.5990  ICD-9-CM: 656.50  10/5/2020 - 10/8/2020        RESOLVED: Urinary tract infection in mother during third trimester of pregnancy ICD-10-CM: O23.43  ICD-9-CM: 646.63, 599.0  8/25/2020 - 10/8/2020    Overview Addendum 9/22/2020  8:21 PM by NEGRITO Carlin     +urine culture 8/20/20. Repeat urine culture at next [facility] 9038 visit. REMBERTO negative 9/18/2020             RESOLVED: Susceptible to varicella (non-immune), currently pregnant ICD-10-CM: O09.899, Z28.3  ICD-9-CM: V49.89  8/22/2020 - 10/8/2020    Overview Signed 8/22/2020  6:47 AM by Janie Schafer MD     Vaccinate postpartum             RESOLVED: Insufficient prenatal care in third trimester ICD-10-CM: O09.33  ICD-9-CM: V23.7  8/14/2020 - 10/8/2020        RESOLVED: Poor fetal growth affecting management of mother in third trimester ICD-10-CM: O36.5930  ICD-9-CM: 656.53  8/14/2020 - 10/8/2020    Overview Addendum 10/2/2020  2:08 PM by Janie Schafer MD     8/14/20 EFW 2lb 11oz 11%, AC 2%. Will repeat US in 3 weeks. No show for 9/4/2020 ultrasound. Scheduled for 9/25/2020.   10/2/20 SGA, EFW 4lb 14oz 3%, AC 0%             RESOLVED: Anemia in pregnancy, third trimester ICD-10-CM: O99.013  ICD-9-CM: 648.23  8/14/2020 - 10/8/2020    Overview Addendum 9/22/2020  8:22 PM by NEGRITO Carlin     Hgb 8/13/2020: 8.5.   8/20/20 8.6  Ferrous sulfate started. 9/18/2020: 7.2 (POC)- ordered iron injections.              RESOLVED: History of gestational diabetes in prior pregnancy, currently pregnant in second trimester ICD-10-CM: O09.292, Z86.32  ICD-9-CM: V23.49  5/27/2020 - 10/8/2020    Overview Addendum 9/22/2020  8:26 PM by NEGRITO Carlin     GDM diet-controlled in 2nd pregnancy  Early GTT 6/12/20 117  A1C= 5.4% on 8/12/2020             RESOLVED: Asthma during pregnancy ICD-10-CM: O99.519, J45.909  ICD-9-CM: 648.93, 493.90  5/27/2020 - 10/8/2020    Overview Signed 5/27/2020 9:34 PM by Shanda Spears NP     No medications x6 years             RESOLVED: History of  delivery, currently pregnant in second trimester ICD-10-CM: O09.892  ICD-9-CM: V23.89  2020 - 10/8/2020    Overview Signed 2020  9:42 PM by Shanda Spears NP     IOL at 35 weeks due to IUGR. No h/o PTL. RESOLVED: At high risk for complications of intrauterine pregnancy (IUP) ICD-10-CM: O09.90  ICD-9-CM: V23.9  4/3/2019 - 2020        RESOLVED: Grand multiparity with current pregnancy in second trimester ICD-10-CM: O09.42  ICD-9-CM: 659.43  2019 - 10/8/2020    Overview Signed 2020  2:10 PM by Gregory Sevilla RN     Referral for BTL sent to [facility] in [facility]: 27 weeks gestation of pregnancy ICD-10-CM: Z3A.27  ICD-9-CM: V22.2  2019 - 2020        RESOLVED: Pyelonephritis affecting pregnancy in second trimester ICD-10-CM: O23.02  ICD-9-CM: 646.63, 590.80  2019 - 2020        RESOLVED: Normal labor ICD-10-CM: O80, Z37.9  ICD-9-CM: 994  2016 - 2020        RESOLVED: Postpartum examination following vaginal delivery ICD-10-CM: Z39.2  ICD-9-CM: V24.2  2016 - 2020        RESOLVED: IUP (intrauterine pregnancy), incidental ICD-10-CM: Z33.1  ICD-9-CM: V22.2  7/3/2014 - 2020        RESOLVED: Labor and delivery indication for care or intervention ICD-10-CM: O75.9  ICD-9-CM: 659.90  2014 - 7/3/2014        RESOLVED: SGA (small for gestational age) ICD-10-CM: P0.11  ICD-9-CM: 764.00  2014 - 7/3/2014              Lab Results   Component Value Date/Time    Rubella, External Immune 2020    GrBStrep, External Negative 2020     Recent Labs     10/08/20  0240   HGB 7.4*       Hospital Course: Postpartum course was complicated by acute blood loss anemia, which added 0 days to the patient's length of stay. Discharge Condition:  Stable, to home.     Patient Instructions:   Current Discharge Medication List      START taking these medications    Details   oxyCODONE-acetaminophen (PERCOCET) 5-325 mg per tablet Take 1 Tab by mouth every six (6) hours as needed for Pain for up to 3 days. Max Daily Amount: 4 Tabs. Qty: 10 Tab, Refills: 0    Associated Diagnoses: S/P  section      ibuprofen (MOTRIN) 800 mg tablet Take 1 Tab by mouth every eight (8) hours as needed for Pain. Indications: pain  Qty: 30 Tab, Refills: 1         CONTINUE these medications which have NOT CHANGED    Details   polyethylene glycol (Miralax) 17 gram/dose powder Take 17 g by mouth daily. docusate sodium (Colace) 100 mg capsule Take 100 mg by mouth two (2) times a day. ferrous sulfate (IRON) 325 mg (65 mg iron) EC tablet Take 1 Tab by mouth every other day. Qty: 60 Tab, Refills: 0    Associated Diagnoses: Anemia during pregnancy in third trimester      PNV No12-Iron-FA-DSS-OM-3 29 mg iron-1 mg -50 mg CPKD Take  by mouth. albuterol (PROVENTIL) 2 mg tablet Take 2 mg by mouth three (3) times daily. Reference my discharge instructions. Follow-up Appointments   Procedures    FOLLOW UP VISIT Appointment in: Two Weeks     Standing Status:   Standing     Number of Occurrences:   1     Order Specific Question:   Appointment in     Answer:    Two Weeks        Signed By:  Deana Duran MD     2020                       BST

## 2020-10-08 NOTE — PROGRESS NOTES
Call received from M/B nurse Opal Garg with concerns of infant discharging home due to mother informed her that her children's grandmother is trying to kidnap her children, cm did speak with mother via phone conversation, pt did admit that her children's grandmother is trying to kidnap her kids and when cm recommended to notify authorities pt stated \" they can't help me cause I don't have custody of my children [place] does\" cm verified patients address and contact number at that time and notified [facility] [phone] to make an referral and to search for possible open case, referral was placed to  Yajaira Nolasco, winsome was informed that he will return call after supervisor review, cm provided cm's contact number and notified nurse Opal Garg of update.

## 2020-10-08 NOTE — PROGRESS NOTES
1500 Spoke with patient about support at home, transportation, and importance of follow up appointment for  for tomorrow 10/9 if she were to get discharged today. Regarding transportation, patient informed this nurse that she does not have a ride Friday- and that her only transportation would be today 10/8 and Monday 10/12 to go to the  appointment scheduled that day with [facility] @ 0830, otherwise she would have to take the bus and does not feel comfortable taking  on bus. Spoke to patient about hospital providing Lyft service or medicaid Taxi if she and  would remain patients until tomorrow, so she can keep scheduled appointment for Monday, as patient cannot find an appointment for tomorrow 10/9. Patient then stated that she needed to get home today as the \"grandmother is trying to kidnap her other children\"      1431 Updated Dr. Dora Arnold about statements mother made. Orders for STAT case management consult     [phone] with Chela Julio with case management about concerns for patient and     36 T. Hunter Prieto, DOREEN updated this nurse that CPS was notified and will be coming to see patient in order to be discharged, see CM note     1840 Mrs. Duarte Scale from [address] here to meet with patient. Copy of ID placed in mother and  chart     5 Bedside and Verbal shift change report given to VICENTE Holland RN (oncoming nurse) by Vidya Shore. JENNIFER Goldberg (preceptor) and Carlie Gunter RN (offgoing nurse). Report included the following information SBAR, Kardex, Procedure Summary, Intake/Output, MAR and Recent Results.

## 2020-10-08 NOTE — PROGRESS NOTES
Problem: Pain  Goal: *Control of Pain  Outcome: Progressing Towards Goal     Problem: Patient Education: Go to Patient Education Activity  Goal: Patient/Family Education  Outcome: Progressing Towards Goal     Problem: Falls - Risk of  Goal: *Absence of Falls  Description: Document Bonita Guevara Fall Risk and appropriate interventions in the flowsheet.   Outcome: Progressing Towards Goal  Note: Fall Risk Interventions:  Mobility Interventions: Patient to call before getting OOB         Medication Interventions: Teach patient to arise slowly    Elimination Interventions: Call light in reach, Patient to call for help with toileting needs              Problem: Patient Education: Go to Patient Education Activity  Goal: Patient/Family Education  Outcome: Progressing Towards Goal     Problem:  Delivery: Postpartum Day 2  Goal: Activity/Safety  Outcome: Progressing Towards Goal  Goal: Nutrition/Diet  Outcome: Progressing Towards Goal  Goal: Discharge Planning  Outcome: Progressing Towards Goal  Goal: Medications  Outcome: Progressing Towards Goal  Goal: Treatments/Interventions/Procedures  Outcome: Progressing Towards Goal  Goal: Psychosocial  Outcome: Progressing Towards Goal  Goal: *Vital signs within defined limits  Outcome: Progressing Towards Goal  Goal: *Labs within defined limits  Outcome: Progressing Towards Goal  Goal: *Hemodynamically stable  Outcome: Progressing Towards Goal  Goal: *Optimal pain control at patient's stated goal  Outcome: Progressing Towards Goal  Goal: *Participates in infant care  Outcome: Progressing Towards Goal  Goal: *Demonstrates progressive activity  Outcome: Progressing Towards Goal  Goal: *Appropriate parent-infant bonding  Outcome: Progressing Towards Goal  Goal: *Tolerating diet  Outcome: Progressing Towards Goal

## 2020-10-09 VITALS
BODY MASS INDEX: 21.35 KG/M2 | OXYGEN SATURATION: 100 % | SYSTOLIC BLOOD PRESSURE: 101 MMHG | TEMPERATURE: 98.5 F | HEART RATE: 67 BPM | HEIGHT: 62 IN | DIASTOLIC BLOOD PRESSURE: 58 MMHG | RESPIRATION RATE: 18 BRPM | WEIGHT: 116 LBS

## 2020-10-09 PROCEDURE — 74011250637 HC RX REV CODE- 250/637: Performed by: OBSTETRICS & GYNECOLOGY

## 2020-10-09 RX ADMIN — OXYCODONE HYDROCHLORIDE AND ACETAMINOPHEN 1 TABLET: 5; 325 TABLET ORAL at 12:08

## 2020-10-09 RX ADMIN — SENNOSIDES 8.6 MG: 8.6 TABLET, FILM COATED ORAL at 08:11

## 2020-10-09 RX ADMIN — OXYCODONE HYDROCHLORIDE AND ACETAMINOPHEN 2 TABLET: 5; 325 TABLET ORAL at 01:00

## 2020-10-09 RX ADMIN — IBUPROFEN 800 MG: 400 TABLET, FILM COATED ORAL at 05:22

## 2020-10-09 RX ADMIN — FERROUS SULFATE TAB 325 MG (65 MG ELEMENTAL FE) 325 MG: 325 (65 FE) TAB at 12:08

## 2020-10-09 NOTE — PROGRESS NOTES
Cm spoke with Bedside nurse Tressa Duran for CPS up, cm was informed that Mrs. Sarah Caldwell from [facility] CPS did visit patient last night but did not provide any information to nurses prior to leaving unit, reports nursing was in report during time of visit, Nurse Tressa Duran informed cm she will call CPS this morning for follow up, [place] CPS can be contacted at [phone].

## 2020-10-09 NOTE — PROGRESS NOTES
Problem: Pain  Goal: *Control of Pain  Outcome: Progressing Towards Goal     Problem:  Delivery: Postpartum Day 2  Goal: Activity/Safety  Outcome: Progressing Towards Goal  Goal: Consults, if ordered  Outcome: Progressing Towards Goal  Goal: Nutrition/Diet  Outcome: Progressing Towards Goal  Goal: Discharge Planning  Outcome: Progressing Towards Goal  Goal: Medications  Outcome: Progressing Towards Goal  Goal: Treatments/Interventions/Procedures  Outcome: Progressing Towards Goal  Goal: Psychosocial  Outcome: Progressing Towards Goal  Goal: *Vital signs within defined limits  Outcome: Progressing Towards Goal  Goal: *Labs within defined limits  Outcome: Progressing Towards Goal  Goal: *Hemodynamically stable  Outcome: Progressing Towards Goal  Goal: *Optimal pain control at patient's stated goal  Outcome: Progressing Towards Goal  Goal: *Participates in infant care  Outcome: Progressing Towards Goal  Goal: *Demonstrates progressive activity  Outcome: Progressing Towards Goal  Goal: *Appropriate parent-infant bonding  Outcome: Progressing Towards Goal  Goal: *Tolerating diet  Outcome: Progressing Towards Goal

## 2020-10-09 NOTE — LACTATION NOTE
Breastfeeding discharge teaching completed to include feeding on demand, foremilk and hindmilk importance, engorgement, mastitis, clogged ducts, pumping, breastmilk storage, and returning to work. Information given about unit and office phone numbers and encouraged mom to reach out if concerns arise, but that [facility] would be calling her in the next few days to follow up on breastfeeding. Mom verbalized understanding and no questions at this time.

## 2020-10-09 NOTE — LACTATION NOTE
This note was copied from a baby's chart. 2034 per mom, no questions or concerns. Per mom, infant latching and nursing well. Will remain available as needed.

## 2020-10-09 NOTE — PROGRESS NOTES
Omero Villeda, RN    Care Management    NURSING    Palliative Care    Addendum    Date of Service:  10/09/20 4555                     []Hide copied text    []Erica for details  Return call received from [facility] worker Mrs. Ivan, winsome was informed that safety plan has been completed,infant can be discharged with mother with supervision of another adult, patients sister Florencia Zuluaga is listed on safety plan as responsible adult but if unavailable mother can have another adult to supervise, also on safety plan mother will be following up  With an mental Health assessment and infants  appointment scheduled for Monday Oct 12,2020, cm was informed by Mrs. Juaquin Lawrence that she gave permission for Mrs. Fregoso to bring copy of safety plan for infant discharge, please place copy of safety plan and ID of adult that will be supervising at discharge, cm will remain available for further questions if needed [phone], cm updated nurse Rashad Gama.       Revision History

## 2020-10-09 NOTE — PROGRESS NOTES
0710: Bedside and Verbal shift change report given to Yetta Bumpers, RN (oncoming nurse) by VICENTE Mariano RN (offgoing nurse). Report included the following information SBAR, Kardex, Intake/Output, MAR and Recent Results. 5013: Shift assessment complete. Patient denies headache, visual disturbances, and right epigastic pain at this time. Breath sounds clear bilaterally. Patient is passing gas, bowel sounds active, with last BM being 10/9/20. Fundus firm at U with scant lochia, no clots noted on assessment. No edema in upper extremities, +1 pitting in lower extremities. Patient free of clonus in lower extremities and denying any pain/tenderness behind knees or in calves. Patient rating cramping pain in the abdomen 5/10 and no pain medication wanted at this time. She also c/o of a 5/10 migraine on the left side. Her left side is swollen around her cheek area and has c/o of tooth pain which she has had before her admission. Ice pack given. Left nipple is sore. Lanolin cream given. Patient educated to notify nursing staff of: SOB, chest pain/heaviness, blurred vision, lightheadedness, increase in bleeding, and passing of clots, patient verbalized understanding. 7524: Rounds completed. No other needs at this time. 1147: Patient had BM. New pads given.  No other needs at this time

## 2020-10-09 NOTE — PROGRESS NOTES
Problem:  Delivery: Postpartum Day 3  Goal: Activity/Safety  Outcome: Progressing Towards Goal  Goal: Consults, if ordered  Outcome: Progressing Towards Goal  Goal: Nutrition/Diet  Outcome: Progressing Towards Goal  Goal: Discharge Planning  Outcome: Progressing Towards Goal  Goal: Medications  Outcome: Progressing Towards Goal  Goal: Treatments/Interventions/Procedures  Outcome: Progressing Towards Goal  Goal: Psychosocial  Outcome: Progressing Towards Goal

## 2020-10-09 NOTE — PROGRESS NOTES
0710 Bedside and Verbal shift change report given to BO Goldberg RN (preceptor) and Chayo Bowden RN (oncoming nurse) by Jered Olvera. Noel Malik RN (offgoing nurse). Report included the following information SBAR, Kardex, OR Summary, Procedure Summary, Intake/Output, MAR and Recent Results. 3832 Left voicemail for Treasure Butterfield with CPS to return phone call in regards to safety plan and information regarding 's discharge     [phone] to contact Mrs. Beryl Rdz on personal cell phone, no answer and voicemail not left on personal device    8463 Updated Cuauhtemoc Ramachandran with CM that this nurse has not been able to get in contact with CPS. CM to contact CPS    0901 LUIS Latham CM updated this nurse that Mrs. Beryl Rdz from [address] is to fax the safety plan to unit. Will place copy in  and mother chart. Per safety plan, mother is to be supervised at home as well as at discharge in order for  to be released, therefore another adult will have to be present at the time of discharge, patient does not need to be supervised in hospital at this time. 3115 LUIS Latham CM updated this nurse about safety plan and plan for discharge. Per safety plan: The mother's sister, Kash Peraza is listed at the responsible adult who will be present at discharge and if not available, mother can have another adult supervise. Per safety plan, mother to attend a mental health evaluation as well as the  appointment scheduled with [facility], Monday 10/12/2020 @ 0830. This nurse to obtain a copy of the safety plan from Mrs. Fregoso as well as a copy of her photo ID.     1526 Patient discharged via wheelchair per protocol. Mrs. Fregoso present at time of discharge, photo ID and copy of safety plan placed in chart. Patient in stable condition. Discharged education reviewed by LUIS Hernandez. Packet given to patient by Chayo Bowden RN. Patient verbalizes understanding of discharge instructions. E-sign completed.  Armbands removed and given to patient. No further needs or questions reported at this time.

## 2022-02-15 ENCOUNTER — HOSPITAL ENCOUNTER (EMERGENCY)
Age: 27
Discharge: HOME | End: 2022-02-15
Attending: EMERGENCY MEDICINE
Payer: MEDICAID

## 2022-02-15 VITALS
TEMPERATURE: 98.2 F | BODY MASS INDEX: 21.16 KG/M2 | OXYGEN SATURATION: 100 % | HEIGHT: 62 IN | SYSTOLIC BLOOD PRESSURE: 109 MMHG | DIASTOLIC BLOOD PRESSURE: 70 MMHG | HEART RATE: 90 BPM | WEIGHT: 115 LBS | RESPIRATION RATE: 16 BRPM

## 2022-02-15 DIAGNOSIS — D64.9 ANEMIA, UNSPECIFIED TYPE: ICD-10-CM

## 2022-02-15 DIAGNOSIS — K64.9 HEMORRHOIDS, UNSPECIFIED HEMORRHOID TYPE: Primary | ICD-10-CM

## 2022-02-15 LAB
ALBUMIN SERPL-MCNC: 4 G/DL (ref 3.4–5)
ALBUMIN/GLOB SERPL: 1 {RATIO} (ref 0.8–1.7)
ALP SERPL-CCNC: 71 U/L (ref 45–117)
ALT SERPL-CCNC: 47 U/L (ref 13–56)
ANION GAP SERPL CALC-SCNC: 4 MMOL/L (ref 3–18)
AST SERPL-CCNC: 50 U/L (ref 10–38)
BASOPHILS # BLD: 0 K/UL (ref 0–0.1)
BASOPHILS NFR BLD: 1 % (ref 0–2)
BILIRUB SERPL-MCNC: 0.2 MG/DL (ref 0.2–1)
BUN SERPL-MCNC: 7 MG/DL (ref 7–18)
BUN/CREAT SERPL: 14 (ref 12–20)
CALCIUM SERPL-MCNC: 8.8 MG/DL (ref 8.5–10.1)
CHLORIDE SERPL-SCNC: 109 MMOL/L (ref 100–111)
CO2 SERPL-SCNC: 28 MMOL/L (ref 21–32)
CREAT SERPL-MCNC: 0.51 MG/DL (ref 0.6–1.3)
DIFFERENTIAL METHOD BLD: ABNORMAL
EOSINOPHIL # BLD: 0.1 K/UL (ref 0–0.4)
EOSINOPHIL NFR BLD: 2 % (ref 0–5)
ERYTHROCYTE [DISTWIDTH] IN BLOOD BY AUTOMATED COUNT: 16 % (ref 11.6–14.5)
GLOBULIN SER CALC-MCNC: 3.9 G/DL (ref 2–4)
GLUCOSE SERPL-MCNC: 73 MG/DL (ref 74–99)
HCG UR QL: NEGATIVE
HCT VFR BLD AUTO: 31.9 % (ref 35–45)
HEMOCCULT STL QL: NEGATIVE
HGB BLD-MCNC: 10.2 G/DL (ref 12–16)
IMM GRANULOCYTES # BLD AUTO: 0 K/UL (ref 0–0.04)
IMM GRANULOCYTES NFR BLD AUTO: 0 % (ref 0–0.5)
LIPASE SERPL-CCNC: 178 U/L (ref 73–393)
LYMPHOCYTES # BLD: 1.9 K/UL (ref 0.9–3.6)
LYMPHOCYTES NFR BLD: 50 % (ref 21–52)
MCH RBC QN AUTO: 25.1 PG (ref 24–34)
MCHC RBC AUTO-ENTMCNC: 32 G/DL (ref 31–37)
MCV RBC AUTO: 78.4 FL (ref 78–100)
MONOCYTES # BLD: 0.3 K/UL (ref 0.05–1.2)
MONOCYTES NFR BLD: 8 % (ref 3–10)
NEUTS SEG # BLD: 1.4 K/UL (ref 1.8–8)
NEUTS SEG NFR BLD: 37 % (ref 40–73)
NRBC # BLD: 0 K/UL (ref 0–0.01)
NRBC BLD-RTO: 0 PER 100 WBC
PLATELET # BLD AUTO: 251 K/UL (ref 135–420)
PMV BLD AUTO: 10.3 FL (ref 9.2–11.8)
POTASSIUM SERPL-SCNC: 3.7 MMOL/L (ref 3.5–5.5)
PROT SERPL-MCNC: 7.9 G/DL (ref 6.4–8.2)
RBC # BLD AUTO: 4.07 M/UL (ref 4.2–5.3)
SODIUM SERPL-SCNC: 141 MMOL/L (ref 136–145)
WBC # BLD AUTO: 3.8 K/UL (ref 4.6–13.2)

## 2022-02-15 PROCEDURE — 82272 OCCULT BLD FECES 1-3 TESTS: CPT

## 2022-02-15 PROCEDURE — 81025 URINE PREGNANCY TEST: CPT

## 2022-02-15 PROCEDURE — 99284 EMERGENCY DEPT VISIT MOD MDM: CPT

## 2022-02-15 PROCEDURE — 80053 COMPREHEN METABOLIC PANEL: CPT

## 2022-02-15 PROCEDURE — 85025 COMPLETE CBC W/AUTO DIFF WBC: CPT

## 2022-02-15 PROCEDURE — 83690 ASSAY OF LIPASE: CPT

## 2022-02-15 RX ORDER — POLYETHYLENE GLYCOL 3350 17 G/17G
17 POWDER, FOR SOLUTION ORAL DAILY
Qty: 595 G | Refills: 0 | Status: SHIPPED | OUTPATIENT
Start: 2022-02-15

## 2022-02-15 RX ORDER — HYDROCORTISONE ACETATE 25 MG/1
25 SUPPOSITORY RECTAL EVERY 12 HOURS
Qty: 24 SUPPOSITORY | Refills: 0 | Status: SHIPPED | OUTPATIENT
Start: 2022-02-15

## 2022-02-15 NOTE — ED TRIAGE NOTES
Pt states that she is having abd pain and what she thought was constipation  When she went to have a bowel movement this morning started having blood in her stool and states she wasn't straining;

## 2022-02-15 NOTE — ED PROVIDER NOTES
EMERGENCY DEPARTMENT HISTORY AND PHYSICAL EXAM    Date: 2/15/2022  Patient Name: Candi Khan    History of Presenting Illness     Chief Complaint   Patient presents with    Abdominal Pain    Melena       History Provided By: Patient    Chief Complaint: anal pain     Additional History (Context):   3:16 PM  Candi Khan is a 31 y.o. female with PMHX hemorrhoids presents to the emergency department C/O anal pain constipation and blood on stool that occurred today. Patient has a history of hemorrhoids. States she has some abdominal discomfort. Unsure of pregnancy status her last menstrual period     PCP: None    Current Outpatient Medications   Medication Sig Dispense Refill    hydrocortisone (Anusol-HC) 25 mg supp Insert 1 Suppository into rectum every twelve (12) hours. 24 Suppository 0    polyethylene glycol (Miralax) 17 gram/dose powder Take 17 g by mouth daily. 1 tablespoon with 8 oz of water daily 595 g 0    ibuprofen (MOTRIN) 800 mg tablet Take 1 Tab by mouth every eight (8) hours as needed for Pain. Indications: pain (Patient not taking: Reported on 2/15/2022) 30 Tab 1    prenatal vit-calcium-iron-fa (Prenatal Plus, calcium carb,) 27 mg iron- 1 mg tab Take 1 Tab by mouth daily. Indications: pregnancy (Patient not taking: Reported on 2/15/2022)      ferrous sulfate (Iron) 325 mg (65 mg iron) tablet Take 325 mg by mouth every other day. (Patient not taking: Reported on 2/15/2022)      albuterol (PROVENTIL VENTOLIN) 2.5 mg /3 mL (0.083 %) nebu 1.25 mg by Nebulization route as needed for Wheezing.  polyethylene glycol (Miralax) 17 gram/dose powder Take 17 g by mouth daily. (Patient not taking: Reported on 2/15/2022)      docusate sodium (Colace) 100 mg capsule Take 100 mg by mouth two (2) times a day. (Patient not taking: Reported on 2/15/2022)      ferrous sulfate (IRON) 325 mg (65 mg iron) EC tablet Take 1 Tab by mouth every other day.  (Patient not taking: Reported on 2/15/2022) 60 Tab 0    albuterol (PROVENTIL) 2 mg tablet Take 2 mg by mouth three (3) times daily.  PNV No12-Iron-FA-DSS-OM-3 29 mg iron-1 mg -50 mg CPKD Take  by mouth. (Patient not taking: Reported on 2/15/2022)         Past History     Past Medical History:  Past Medical History:   Diagnosis Date    Abnormal Papanicolaou smear of cervix     Anemia     Asthma     Asthma     Frequent UTI     Gestational diabetes     History of chlamydia 2011    HX OTHER MEDICAL     [place] Palsy in last pregnancy    Postpartum depression     severe    Psychiatric problem     depression, anxiety, and PTSD    Pyelonephritis affecting pregnancy in second trimester 1/8/2019    Sickle cell trait syndrome ([facility] [address]        Past Surgical History:  History reviewed. No pertinent surgical history. Family History:  Family History   Problem Relation Age of Onset    Diabetes Maternal Grandmother     Hypertension Maternal Grandmother     Diabetes Paternal Grandmother     Heart Disease Paternal Grandmother     Hypertension Paternal Grandmother        Social History:  Social History     Tobacco Use    Smoking status: Current Every Day Smoker     Packs/day: 0.25    Smokeless tobacco: Never Used   Substance Use Topics    Alcohol use: No    Drug use: Yes     Types: Marijuana       Allergies: Allergies   Allergen Reactions    Banana Itching    Banana Rash and Itching    Coconut Itching    Iodine Swelling     Pt states she has had betadine in the past and can have that    Kiwi Itching    Kiwi Itching    [place] Swelling    Shellfish Derived Swelling    Shellfish Derived Swelling    Watermelon Swelling    Watermelon Itching       Review of Systems   Review of Systems   Constitutional: Negative for chills and fever. Respiratory: Negative for shortness of breath. Cardiovascular: Negative for chest pain. Gastrointestinal: Positive for abdominal pain, anal bleeding and rectal pain. Negative for diarrhea, nausea and vomiting. Musculoskeletal: Negative for back pain. Neurological: Negative for weakness and numbness. All other systems reviewed and are negative. Physical Exam     Vitals:    02/15/22 1349   BP: 109/70   Pulse: 90   Resp: 16   Temp: 98.2 °F (36.8 °C)   SpO2: 100%   Weight: 52.2 kg (115 lb)   Height: 5' 2\" (1.575 m)     Physical Exam  Vitals and nursing note reviewed. Exam conducted with a chaperone present. Constitutional:       Appearance: She is well-developed. HENT:      Head: Normocephalic and atraumatic. Cardiovascular:      Rate and Rhythm: Normal rate and regular rhythm. Heart sounds: Normal heart sounds. No murmur heard. Pulmonary:      Effort: Pulmonary effort is normal. No respiratory distress. Breath sounds: Normal breath sounds. No wheezing or rales. Abdominal:      General: Bowel sounds are normal.      Palpations: Abdomen is soft. Tenderness: There is no abdominal tenderness. Genitourinary:     Rectum: Guaiac result negative. Tenderness and external hemorrhoid present. Musculoskeletal:      Cervical back: Normal range of motion and neck supple. Neurological:      Mental Status: She is alert and oriented to person, place, and time.    Psychiatric:         Judgment: Judgment normal.         Diagnostic Study Results     Labs:     Recent Results (from the past 12 hour(s))   CBC WITH AUTOMATED DIFF    Collection Time: 02/15/22  2:15 PM   Result Value Ref Range    WBC 3.8 (L) 4.6 - 13.2 K/uL    RBC 4.07 (L) 4.20 - 5.30 M/uL    HGB 10.2 (L) 12.0 - 16.0 g/dL    HCT 31.9 (L) 35.0 - 45.0 %    MCV 78.4 78.0 - 100.0 FL    MCH 25.1 24.0 - 34.0 PG    MCHC 32.0 31.0 - 37.0 g/dL    RDW 16.0 (H) 11.6 - 14.5 %    PLATELET 506 826 - 587 K/uL    MPV 10.3 9.2 - 11.8 FL    NRBC 0.0 0  WBC    ABSOLUTE NRBC 0.00 0.00 - 0.01 K/uL    NEUTROPHILS 37 (L) 40 - 73 %    LYMPHOCYTES 50 21 - 52 %    MONOCYTES 8 3 - 10 %    EOSINOPHILS 2 0 - 5 %    BASOPHILS 1 0 - 2 %    IMMATURE GRANULOCYTES 0 0.0 - 0.5 %    ABS. NEUTROPHILS 1.4 (L) 1.8 - 8.0 K/UL    ABS. LYMPHOCYTES 1.9 0.9 - 3.6 K/UL    ABS. MONOCYTES 0.3 0.05 - 1.2 K/UL    ABS. EOSINOPHILS 0.1 0.0 - 0.4 K/UL    ABS. BASOPHILS 0.0 0.0 - 0.1 K/UL    ABS. IMM. GRANS. 0.0 0.00 - 0.04 K/UL    DF AUTOMATED     METABOLIC PANEL, COMPREHENSIVE    Collection Time: 02/15/22  2:15 PM   Result Value Ref Range    Sodium 141 136 - 145 mmol/L    Potassium 3.7 3.5 - 5.5 mmol/L    Chloride 109 100 - 111 mmol/L    CO2 28 21 - 32 mmol/L    Anion gap 4 3.0 - 18 mmol/L    Glucose 73 (L) 74 - 99 mg/dL    BUN 7 7.0 - 18 MG/DL    Creatinine 0.51 (L) 0.6 - 1.3 MG/DL    BUN/Creatinine ratio 14 12 - 20      GFR est AA >60 >60 ml/min/1.73m2    GFR est non-AA >60 >60 ml/min/1.73m2    Calcium 8.8 8.5 - 10.1 MG/DL    Bilirubin, total 0.2 0.2 - 1.0 MG/DL    ALT (SGPT) 47 13 - 56 U/L    AST (SGOT) 50 (H) 10 - 38 U/L    Alk. phosphatase 71 45 - 117 U/L    Protein, total 7.9 6.4 - 8.2 g/dL    Albumin 4.0 3.4 - 5.0 g/dL    Globulin 3.9 2.0 - 4.0 g/dL    A-G Ratio 1.0 0.8 - 1.7     LIPASE    Collection Time: 02/15/22  2:15 PM   Result Value Ref Range    Lipase 178 73 - 393 U/L   HCG URINE, QL. - POC    Collection Time: 02/15/22  4:07 PM   Result Value Ref Range    Pregnancy test,urine (POC) Negative NEG     OCCULT BLOOD, STOOL    Collection Time: 02/15/22  4:09 PM   Result Value Ref Range    Occult blood, stool Negative NEG         Radiologic Studies:   No orders to display     CT Results  (Last 48 hours)    None        CXR Results  (Last 48 hours)    None          Medical Decision Making   I am the first provider for this patient. I reviewed the vital signs, available nursing notes, past medical history, past surgical history, family history and social history. Vital Signs: Reviewed the patient's vital signs.     Pulse Oximetry Analysis: 100% on RA         Records Reviewed: Nursing Notes and Old Medical Records    Procedures:  Procedures    ED Course:   3:16 PM Initial assessment performed. The patients presenting problems have been discussed, and they are in agreement with the care plan formulated and outlined with them. I have encouraged them to ask questions as they arise throughout their visit. Discussion:  Pt presents with anal pain blood on stool and constipation recently. Exam and history consistent with hemorrhoid. Will treat with Anusol and MiraLAX and have patient follow-up with her doctor H&H stable and consistent with her baseline Hemoccult negative for blood. Strict return precautions given, pt offering no questions or complaints. Diagnosis and Disposition     DISCHARGE NOTE:  Emmanuel Alcazar  results have been reviewed with her. She has been counseled regarding her diagnosis, treatment, and plan. She verbally conveys understanding and agreement of the signs, symptoms, diagnosis, treatment and prognosis and additionally agrees to follow up as discussed. She also agrees with the care-plan and conveys that all of her questions have been answered. I have also provided discharge instructions for her that include: educational information regarding their diagnosis and treatment, and list of reasons why they would want to return to the ED prior to their follow-up appointment, should her condition change. She has been provided with education for proper emergency department utilization. CLINICAL IMPRESSION:    1. Hemorrhoids, unspecified hemorrhoid type    2. Anemia, unspecified type        PLAN:  1. D/C Home  2. Discharge Medication List as of 2/15/2022  4:37 PM      START taking these medications    Details   hydrocortisone (Anusol-HC) 25 mg supp Insert 1 Suppository into rectum every twelve (12) hours. , Normal, Disp-24 Suppository, R-0      !! polyethylene glycol (Miralax) 17 gram/dose powder Take 17 g by mouth daily. 1 tablespoon with 8 oz of water daily, Normal, Disp-595 g, R-0       !! - Potential duplicate medications found. Please discuss with provider. CONTINUE these medications which have NOT CHANGED    Details   ibuprofen (MOTRIN) 800 mg tablet Take 1 Tab by mouth every eight (8) hours as needed for Pain. Indications: pain, Normal, Disp-30 Tab,R-1      prenatal vit-calcium-iron-fa (Prenatal Plus, calcium carb,) 27 mg iron- 1 mg tab Take 1 Tab by mouth daily. Indications: pregnancy, Historical Med      ferrous sulfate (Iron) 325 mg (65 mg iron) tablet Take 325 mg by mouth every other day., Historical Med      albuterol (PROVENTIL VENTOLIN) 2.5 mg /3 mL (0.083 %) nebu 1.25 mg by Nebulization route as needed for Wheezing., Historical Med      !! polyethylene glycol (Miralax) 17 gram/dose powder Take 17 g by mouth daily. , Historical Med      docusate sodium (Colace) 100 mg capsule Take 100 mg by mouth two (2) times a day., Historical Med      ferrous sulfate (IRON) 325 mg (65 mg iron) EC tablet Take 1 Tab by mouth every other day., Normal, Disp-60 Tab,R-0      albuterol (PROVENTIL) 2 mg tablet Take 2 mg by mouth three (3) times daily. , Historical Med      PNV No12-Iron-FA-DSS-OM-3 29 mg iron-1 mg -50 mg CPKD Take  by mouth., Historical Med       !! - Potential duplicate medications found. Please discuss with provider. 3.   Follow-up Information     Follow up With Specialties Details Why Contact Info    Lisa Hood DO Colon and Rectal Surgery Schedule an appointment as soon as possible for a visit   [address] [place] [address] EMERGENCY DEPT Emergency Medicine  If symptoms worsen [address] [address]  [phone]                 Please note that this dictation was completed with [employer], the [employer] voice recognition software. Quite often unanticipated grammatical, syntax, homophones, and other interpretive errors are inadvertently transcribed by the computer software. Please disregard these errors. Please excuse any errors that have escaped final proofreading.

## 2022-03-18 PROBLEM — Z87.59 HISTORY OF PRIOR PREGNANCY WITH IUGR NEWBORN: Status: ACTIVE | Noted: 2020-05-27

## 2022-03-18 PROBLEM — Z98.891 S/P CESAREAN SECTION: Status: ACTIVE | Noted: 2020-10-06

## 2022-03-18 PROBLEM — O99.013 ANEMIA COMPLICATING PREGNANCY IN THIRD TRIMESTER: Status: ACTIVE | Noted: 2020-09-21

## 2022-03-18 PROBLEM — Z34.90 PREGNANCY: Status: ACTIVE | Noted: 2020-09-30

## 2022-03-18 PROBLEM — F12.90 MARIJUANA USE: Status: ACTIVE | Noted: 2020-05-27

## 2022-03-19 PROBLEM — O32.6XX0: Status: ACTIVE | Noted: 2020-10-06

## 2022-03-20 PROBLEM — F17.200 CURRENT SMOKER: Status: ACTIVE | Noted: 2020-05-27

## 2023-06-18 ENCOUNTER — HOSPITAL ENCOUNTER (EMERGENCY)
Facility: HOSPITAL | Age: 28
Discharge: HOME | End: 2023-06-18
Attending: EMERGENCY MEDICINE

## 2023-06-18 VITALS
SYSTOLIC BLOOD PRESSURE: 106 MMHG | OXYGEN SATURATION: 98 % | HEART RATE: 76 BPM | BODY MASS INDEX: 21.22 KG/M2 | TEMPERATURE: 98.2 F | DIASTOLIC BLOOD PRESSURE: 68 MMHG | WEIGHT: 116 LBS | RESPIRATION RATE: 18 BRPM

## 2023-06-18 DIAGNOSIS — S41.111A LACERATION OF RIGHT UPPER EXTREMITY, INITIAL ENCOUNTER: Primary | ICD-10-CM

## 2023-06-18 PROCEDURE — 99284 EMERGENCY DEPT VISIT MOD MDM: CPT

## 2023-06-18 PROCEDURE — 90471 IMMUNIZATION ADMIN: CPT | Performed by: PHYSICIAN ASSISTANT

## 2023-06-18 PROCEDURE — 6360000002 HC RX W HCPCS: Performed by: PHYSICIAN ASSISTANT

## 2023-06-18 PROCEDURE — 90714 TD VACC NO PRESV 7 YRS+ IM: CPT | Performed by: PHYSICIAN ASSISTANT

## 2023-06-18 RX ADMIN — CLOSTRIDIUM TETANI TOXOID ANTIGEN (FORMALDEHYDE INACTIVATED) AND CORYNEBACTERIUM DIPHTHERIAE TOXOID ANTIGEN (FORMALDEHYDE INACTIVATED) 0.5 ML: 5; 2 INJECTION, SUSPENSION INTRAMUSCULAR at 23:31

## 2023-06-18 ASSESSMENT — PAIN - FUNCTIONAL ASSESSMENT: PAIN_FUNCTIONAL_ASSESSMENT: NONE - DENIES PAIN

## 2023-06-18 ASSESSMENT — ENCOUNTER SYMPTOMS
SHORTNESS OF BREATH: 0
ABDOMINAL PAIN: 0
VOMITING: 0
NAUSEA: 0

## 2023-06-19 NOTE — ED TRIAGE NOTES
Pt works at [employer], was pulling items.   There was a kitchen knife sticking out and was cut on right forearm

## 2023-06-19 NOTE — ED PROVIDER NOTES
General: She is not in acute distress. Appearance: Normal appearance. She is not ill-appearing, toxic-appearing or diaphoretic. HENT:      Head: Normocephalic and atraumatic. Cardiovascular:      Rate and Rhythm: Normal rate and regular rhythm. Pulmonary:      Effort: Pulmonary effort is normal.      Breath sounds: Normal breath sounds. Musculoskeletal:         General: Normal range of motion. Cervical back: Normal range of motion and neck supple. Skin:     General: Skin is warm. Findings: No rash. Comments: Full ROM of upper extremity, radial pulse 2+    Neurological:      General: No focal deficit present. Mental Status: She is alert and oriented to person, place, and time. Cranial Nerves: No cranial nerve deficit. Sensory: No sensory deficit. Motor: No weakness. Diagnostic Study Results     Labs -  No results found for this or any previous visit (from the past 12 hour(s)). Radiologic Studies -   No orders to display         Medical Decision Making   I am the first provider for this patient. I reviewed the vital signs, available nursing notes, past medical history, past surgical history, family history and social history. Vital Signs-Reviewed the patient's vital signs. Records Reviewed: Nursing Notes and Old Medical Records (Time of Review: 11:20 PM)    ED Course: Progress Notes, Reevaluation, and Consults:  11:20 PM: Reviewed plan with patient. Discussed need for close outpatient follow-up this week for reassessment. Discussed strict return precautions, including fever, drainage, or any other medical concerns. Provider Notes (Medical Decision Making): 21-year-old female who presents to the ED due to right forearm laceration that occurred just prior to arrival.  Extremity neurovascularly intact. Full range of motion and strength of extremity. Small superficial laceration present without active bleeding or foreign body.   Based on size

## (undated) DEVICE — STAPLER SKIN SQ 30 ABSRB STPL DISP INSORB

## (undated) DEVICE — REM POLYHESIVE ADULT PATIENT RETURN ELECTRODE: Brand: VALLEYLAB

## (undated) DEVICE — SUT VCRL + 2-0 36IN CT1 UD --

## (undated) DEVICE — HEX-LOCKING BLADE ELECTRODE: Brand: EDGE

## (undated) DEVICE — DERMABOND SKIN ADH 0.7ML --

## (undated) DEVICE — SUTURE MCRYL SZ 1 L36IN ABSRB UD L36MM CT-1 1/2 CIR Y947H

## (undated) DEVICE — LARGE, DISPOSABLE ALEXIS O C-SECTION PROTECTOR - RETRACTOR: Brand: ALEXIS ® O C-SECTION PROTECTOR - RETRACTOR

## (undated) DEVICE — 3L THIN WALL CAN: Brand: CRD

## (undated) DEVICE — SOL IRRIGATION INJ NACL 0.9% 500ML BTL

## (undated) DEVICE — GARMENT,MEDLINE,DVT,INT,CALF,MED, GEN2: Brand: MEDLINE

## (undated) DEVICE — PACK PROCEDURE SURG BIRTH

## (undated) DEVICE — STRAP,POSITIONING,KNEE/BODY,FOAM,4X60": Brand: MEDLINE

## (undated) DEVICE — INTENDED FOR TISSUE SEPARATION, AND OTHER PROCEDURES THAT REQUIRE A SHARP SURGICAL BLADE TO PUNCTURE OR CUT.: Brand: BARD-PARKER ® CARBON RIB-BACK BLADES

## (undated) DEVICE — SUTURE VCRL SZ 1 L36IN ABSRB UD L36MM CTB-1 1/2 CIR BLNT JB947